# Patient Record
Sex: MALE | Race: BLACK OR AFRICAN AMERICAN
[De-identification: names, ages, dates, MRNs, and addresses within clinical notes are randomized per-mention and may not be internally consistent; named-entity substitution may affect disease eponyms.]

---

## 2017-03-03 NOTE — PDOC
History of Present Illness





- General


History Source: Patient


Exam Limitations: No Limitations





- History of Present Illness


Initial Comments: 


03/03/17 16:15


The patient is a 36 year old male, with significant past medical history of 

multiple episodes of cellulitis (most recently in 2012) and obesity, who 

presents today complaining of 1 day of right groin pain, subjective fever, SOB, 

and 3 days of runny nose and a dry throat. The patient states that the groin 

and leg pain started this morning. The pain radiates from the groin, down the 

RLE, to the right lower ankle. The pain is exacerbated upon movement, touch/

pressure and is alleviated secondary to laying down. He states that this pain 

is similar to the symptoms experienced with cellulitis in the past. The patient 

states that he became short of breath this morning while sitting down. He 

reports a subjective fever and chills also starting this morning. He notes a 

runny nose and dry throat over the past couple of days and has taken nyquil for 

these symptoms.





No cough. 


No abdominal pain, nausea, vomiting.


Denies recent strenuous activity or recent trauma.





Allergies: none reported


Social Hx: No tobacco use. 


PCP- Dr. Galina Morgan ([phone])








03/03/17 16:21








<Viv Sibley - Last Filed: 03/03/17 17:28>





<Thierry Hicks - Last Filed: 03/03/17 17:43>





- General


Chief Complaint: Pain, Acute


Stated Complaint: RIGHT LEG PAIN


Time Seen by Provider: 03/03/17 14:22





Past History





<Viv Sibley - Last Filed: 03/03/17 17:28>





- Past Medical History


Other medical history: DENIES





- Psycho/Social/Smoking Cessation Hx


Anxiety: No


Suicidal Ideation: No


Smoking Status: No


Smoking History: Never smoked


Years of Tobacco Use: 0


Number of Cigarettes Smoked Daily: 0


Hx Alcohol Use: No


Drug/Substance Use Hx: No


Substance Use Type: None


Hx Substance Use Treatment: No





<Thierry Hicks - Last Filed: 03/03/17 17:43>





- Past Medical History


Allergies/Adverse Reactions: 


 Allergies











Allergy/AdvReac Type Severity Reaction Status Date / Time


 


No Known Allergies Allergy   Verified 03/03/17 14:14











Home Medications: 


Ambulatory Orders





NK [No Known Home Medication]  03/03/17 











**Review of Systems





- Review of Systems


Comments:: 





03/03/17 16:15


CONSTITUTIONAL:


Present: subjective fever, chills


Absent: Diaphoresis, Generalized Weakness, Malaise, Loss of Appetite


HEENT:


Present: Rhinorrhea, dry throat.


Absent: Nasal Congestion, Throat Swelling, Difficulty Swallowing, Mouth Swelling

, Ear Pain, Eye Pain, Visual Changes


CARDIOVASCULAR:


Absent: Chest Pain, Syncope, Palpitations, Irregular Heart Rate, Lightheadedness

, Peripheral Edema


RESPIRATORY:


Present: Shortness of breath


Absent: Cough, SOB with Exertion, Orthopnea, Wheezing, Stridor, Hemoptysis


GASTROINTESTINAL:


Absent: Abdominal pain, Abdominal Distension, Nausea, Vomiting, Diarrhea, 

Constipation, Melena, Hematochezia


GENITOURINARY:


Absent: Dysuria, Frequency, Urgency, Hesitancy, Flank Pain, Genital Pain


MUSCULOSKELETAL:


Present: right groin pain radiating down the right leg to the right ankle.


Absent: Myalgia, Arthralgia, Joint Swelling, Back pain, Neck Pain


SKIN:


Absent: Rash, Itching, Pallor








<Viv Sibley - Last Filed: 03/03/17 17:28>





*Physical Exam





- Vital Signs


 Last Vital Signs











Temp Pulse Resp BP Pulse Ox


 


 103 F H  141 H  24   131/76   98 


 


 03/03/17 14:13  03/03/17 14:13  03/03/17 14:13  03/03/17 14:13  03/03/17 14:13














- Physical Exam


Comments: 


03/03/17 16:17


GENERAL: The patient is awake, alert, and fully oriented, in no acute distress. 

+He appears tachypneic. +Patient is morbidly obese and states he weighs 425lbs. 


HEAD: Normal with no signs of trauma.


EYES: Pupils equal, round and reactive to light, extraocular movements intact, 

sclera anicteric, conjunctiva clear.


ENT: Ears normal, nares patent, oropharynx clear without exudates. Moist mucous 

membranes.


NECK: Normal range of motion, supple without lymphadenopathy, JVD, or masses.


LUNGS: Breath sounds equal, clear to auscultation bilaterally. No wheezes, and 

no crackles.


HEART: +tachycardic. Regular rhythm, normal S1 and S2 without murmur, rub or 

gallop.


ABDOMEN: +Obese. Soft, nontender, normoactive bowel sounds. No guarding, no 

rebound. No masses.


EXTREMITIES: +Right hip and knee with normal ROM with no pain. Right medial 

lower leg with increased warmth, increased redness, and tenderness to 

palpation.Pulses normal. Remainder of the extremities normal.


NEUROLOGICAL: Cranial nerves II through XII grossly intact. Normal speech, 

normal gait.


PSYCH: Normal mood, normal affect.


SKIN: +erythematous right medial ankle and right leg with increased warmth to 

that area. Warm, Dry, normal turgor, no rashes or lesions noted.








<Viv Sibley - Last Filed: 03/03/17 17:28>





- Vital Signs


 Last Vital Signs











Temp Pulse Resp BP Pulse Ox


 


 103 F H  141 H  24   131/76   98 


 


 03/03/17 14:13  03/03/17 14:13  03/03/17 14:13  03/03/17 14:13  03/03/17 14:13














<Thierry Hicks - Last Filed: 03/03/17 17:43>





**Heart Score/ECG Review





- ECG Intrepretation


Comment:: 





03/03/17 16:14


12 Lead EKG shows sinus tachycardia at a rate of 114 bpm.  The axis is normal.  

The intervals are normal.  There are no acute ST elevations or depressions.





Impression: Sinus tachycardia at a rate of 114 bpm.  Otherwise normal EKG.





<Thierry Hicks - Last Filed: 03/03/17 17:43>





ED Treatment Course





- LABORATORY


CBC & Chemistry Diagram: 


 03/03/17 15:16





 03/03/17 15:16





- ADDITIONAL ORDERS


Additional order review: 


 Laboratory  Results











  03/03/17





  15:16


 


Sodium  130 L


 


Potassium  3.8


 


Chloride  98  D


 


Carbon Dioxide  23


 


Anion Gap  9


 


BUN  12  D


 


Creatinine  1.1  D


 


Creat Clearance w eGFR  > 60


 


Random Glucose  91


 


Calcium  9.0


 


Total Bilirubin  0.7  D


 


AST  20  D


 


ALT  25


 


Alkaline Phosphatase  76  D


 


Total Protein  7.6  D


 


Albumin  3.9  D














- RADIOLOGY


Radiograph Interpretation: 


03/03/17 16:58


Chest X-ray 


As reported by Dr. Nico Swan.


Impression: Since 6/5/2013 there are prominent soft tissues with slight 

increase in central markings, but no sign of infiltrate or failure. The 

mediastinum is not widened. Correlation recommended. If symptoms persist, 

futher imaging may be of help.





03/03/17 17:30


EXAM#: TYPE/EXAM: RESULT: [id number] US/DUPLEX VASCUL US-1 LEG Right lower 

extremity venous ultrasound. Clinical information given: right leg pain, 

evaluate for DVT The exam was performed utilizing compression, grayscale, color 

flow and doppler sonography. 





There is no sonographic evidence of deep vein thrombosis. If there is clinical 

concern for possible isolated deep calf vein thrombosis or if there is a 

clinical diagnosis of uncomplicated superficial thrombophlebitis, then 

correlation with follow up sonography is suggested in approximately 3-7 days.. 

There is no obvious popliteal cyst. Impression: No DVT is identified involving 

the right leg. Please see above. Reported By: Jose Rafael Orta MD 03/03/17 [id number] 





- Medications


Given in the ED: 


ED Medications














Discontinued Medications














Generic Name Dose Route Start Last Admin





  Trade Name Freq  PRN Reason Stop Dose Admin


 


Acetaminophen  975 mg 03/03/17 15:04 03/03/17 15:36





  Tylenol -  PO 03/03/17 15:05  975 mg





  ONCE ONE   Administration


 


Sodium Chloride  1,000 mls @ 1,000 mls/hr 03/03/17 15:01 03/03/17 15:36





  Normal Saline -  IV 03/03/17 16:00  1,000 mls/hr





  ASDIR STA   Administration


 


Vancomycin HCl 1,500 mg/  250 mls @ 250 mls/hr 03/03/17 15:07 03/03/17 16:06





  Dextrose  IVPB 03/03/17 16:06  250 mls/hr





  ONCE ONE   Administration


 


Piperacillin Sod/Tazobactam Sod  4.5 gm 03/03/17 15:07 03/03/17 15:25





  Zosyn -  IVPB 03/03/17 15:08  4.5 gm





  ONCE ONE   Administration














<Viv Sibley - Last Filed: 03/03/17 17:28>





- LABORATORY


CBC & Chemistry Diagram: 


 03/03/17 15:16





 03/03/17 15:16





- ADDITIONAL ORDERS


Additional order review: 


 Laboratory  Results











  03/03/17





  15:16


 


Sodium  130 L


 


Potassium  3.8


 


Chloride  98  D


 


Carbon Dioxide  23


 


Anion Gap  9


 


BUN  12  D


 


Creatinine  1.1  D


 


Creat Clearance w eGFR  > 60


 


Random Glucose  91


 


Calcium  9.0


 


Total Bilirubin  0.7  D


 


AST  20  D


 


ALT  25


 


Alkaline Phosphatase  76  D


 


Total Protein  7.6  D


 


Albumin  3.9  D














- RADIOLOGY


Radiology Studies Ordered: 














 Category Date Time Status


 


 CHEST X-RAY PORTABLE* [RAD] Stat Radiology  03/03/17 15:02 Taken


 


 DUPLEX VASCUL US-1 LEG [US] Stat Ultrasound  03/03/17 16:10 Ordered














- Medications


Given in the ED: 


ED Medications














Discontinued Medications














Generic Name Dose Route Start Last Admin





  Trade Name Sukh  PRN Reason Stop Dose Admin


 


Acetaminophen  975 mg 03/03/17 15:04 03/03/17 15:36





  Tylenol -  PO 03/03/17 15:05  975 mg





  ONCE ONE   Administration


 


Sodium Chloride  1,000 mls @ 1,000 mls/hr 03/03/17 15:01 03/03/17 15:36





  Normal Saline -  IV 03/03/17 16:00  1,000 mls/hr





  ASDIR STA   Administration


 


Vancomycin HCl 1,500 mg/  250 mls @ 250 mls/hr 03/03/17 15:07 03/03/17 16:06





  Dextrose  IVPB 03/03/17 16:06  250 mls/hr





  ONCE ONE   Administration


 


Piperacillin Sod/Tazobactam Sod  4.5 gm 03/03/17 15:07 03/03/17 15:25





  Zosyn -  IVPB 03/03/17 15:08  4.5 gm





  ONCE ONE   Administration














<Thierry Hicks - Last Filed: 03/03/17 17:43>





Medical Decision Making





- Critical Care Time


Total Critical Care Time (minutes): 55 (r/o severe sepsis)


Critical Care Statement: The care of this patient involved high complexity 

decision making to prevent further life threatening deterioration of the patient

's condition and/or to evalute & treat vital organ system(s) failure or risk of 

failure.





- Medical Decision Making


03/03/17 17:30


36-year-old man with a history of multiple episodes of recurrent cellulitis 

requiring hospitalization on multiple prior episodes.  Patient presents 

complaining of fever, chills, and right leg pain consistent with prior episodes 

of cellulitis.  He states he has tenderness and redness to the right medial 

lower leg.  He denies cough or dysuria.





On examination, the patient has a fever to 103 and tachycardia to 140.  His 

head and neck exam is without signs of upper respiratory infection.  Lungs are 

clear.  Abdomen is benign.  Extremities are notable for the right lower leg 

being warmer than the left with redness and tenderness to the right medial 

lower leg.  Pulses are normal.  There is no muscle tenderness or induration.





Laboratory studies reviewed.  White blood cell count is markedly elevated with 

increased neutrophils.  Chest x-ray is clear.  Lactate is normal.  Renal 

function is normal.  There was an initial concern for severe sepsis, however 

there are no signs of severe sepsis by laboratory criteria.  VBG shows 

respiratory alkalosis.  D-dimer screening test is negative for DVT.  Ultrasound 

of the right lower extremity is negative for DVT.





Patient complained of some dyspnea, but with that was in the setting of fever 

that resolved with Tylenol.  Pulse oximetry on room air despite severe obesity 

has been %.  





Impression: Right lower extremity cellulitis, recurrent, with high fever and 

markedly elevated white blood cell count.  Patient will be treated with IV 

antibiotics pending clinical improvement.





The scribe's documentation has been prepared under my direction and personally 

reviewed by me in its entirety.  I have confirmed that the note above 

accurately reflects all work, treatment, procedures, and medical decision-

making performed by me.





03/03/17 17:42


Discussed with hospitalist and they will admit.





<Thierry Hicks - Last Filed: 03/03/17 17:43>





*DC/Admit/Observation/Transfer





- Attestations


Scribe Attestion: 





03/03/17 16:18





Documentation prepared by OLAMIDE Silva, acting as medical scribe 

for Thierry Hicks MD.





<Viv Sibley - Last Filed: 03/03/17 17:28>





- Discharge Dispostion


Admit: Yes


Decision to Admit order Date/Time: 





03/03/17 17:35


Right lower extremity cellulitis, hospitalist texted.





<Thierry Hicks - Last Filed: 03/03/17 17:43>


Diagnosis at time of Disposition: 


 Cellulitis of right lower extremity





- Discharge Dispostion


Condition at time of disposition: Stable

## 2017-03-03 NOTE — HP
CHIEF COMPLAINT: Right Groin, R Lower Extremity Pain





PCP: Not on Staff





HISTORY OF PRESENT ILLNESS:


This is a 37 y/o male with a past medical history of recurrent lower extremity 

cellulitis ( last event 2012), Morbid Obesity. Who presents to the emergency 

department with pain to right groin radiating to RLE. Patient also reports 

increased swelling and redness to right medial ankle x this am. Patient reports 

increase pain with movement. Patient reports having subjective fever at home 

with a runny nose and throat irritation. Patient denies SOB, CP, AP, N/V/D, 

constipation, diarrhea, dysuria. 





ER course was notable for:


(1) Sepsis: T 103, P 141, WBC 18.5 with L shift


(2) Duplex Right lower extremity- No DVT


(3) Vancomycin, Zosyn given in ED





Recent Travel: None





PAST MEDICAL HISTORY:


See HPI





PAST SURGICAL HISTORY:


Denies





Social History:


Smoking: Denies


Alcohol:  Denies


Drugs:    Denies


Lives with spouse





Family History:


Diabetes: Mother, Father


HTN: Mother, Father





Allergies





No Known Allergies Allergy (Verified 03/03/17 14:14)


 








HOME MEDICATIONS:


 Home Medications











 Medication  Instructions  Recorded


 


NK [No Known Home Medication]  03/03/17








 REVIEW OF SYSTEMS


CONSTITUTIONAL:  fever


Absent: chills, diaphoresis, generalized weakness, malaise, loss of appetite, 

weight change


HEENT: rhinorrhea, dry throat


Absent:  nasal congestion, throat pain, throat swelling, difficulty swallowing, 

mouth swelling, ear pain, eye pain, visual changes


CARDIOVASCULAR: 


Absent: chest pain, syncope, palpitations, irregular heart rate, lightheadedness

, peripheral edema


RESPIRATORY: 


Absent: cough, shortness of breath, dyspnea with exertion, orthopnea, wheezing, 

stridor, hemoptysis


GASTROINTESTINAL:


Absent: abdominal pain, abdominal distension, nausea, vomiting, diarrhea, 

constipation, melena, hematochezia


GENITOURINARY: right groin pain


Absent: dysuria, frequency, urgency, hesitancy, hematuria, flank pain, genital 

pain


MUSCULOSKELETAL: right leg pain


Absent: myalgia, arthralgia, joint swelling, back pain, neck pain


SKIN: 


Absent: rash, itching, pallor


HEMATOLOGIC/IMMUNOLOGIC: 


Absent: easy bleeding, easy bruising, lymphadenopathy, frequent infections


ENDOCRINE:


Absent: unexplained weight gain, unexplained weight loss, heat intolerance, 

cold intolerance


NEUROLOGIC: 


Absent: headache, focal weakness or paresthesias, dizziness, unsteady gait, 

seizure, mental status changes, bladder or bowel incontinence


PSYCHIATRIC: 


Absent: anxiety, depression, suicidal or homicidal ideation, hallucinations.








PHYSICAL EXAMINATION


 Vital Signs - 24 hr











  03/03/17 03/03/17 03/03/17





  14:13 16:38 17:28


 


Temperature 103 F H  102.9 F H


 


Pulse Rate 141 H  


 


Pulse Rate [  117 H 117 H





Apical]   


 


Respiratory 24 22 24





Rate   


 


Blood Pressure 131/76  


 


Blood Pressure  102/58 101/71





[Right]   


 


O2 Sat by Pulse 98 97 98





Oximetry (%)   














  03/03/17





  18:27


 


Temperature 100.9 F H


 


Pulse Rate 


 


Pulse Rate [ 114 H





Apical] 


 


Respiratory 24





Rate 


 


Blood Pressure 


 


Blood Pressure 100/56





[Right] 


 


O2 Sat by Pulse 98





Oximetry (%) 











GENERAL: Morbidly Obese, Awake, alert, and fully oriented, in no acute distress.


HEAD: Normal with no signs of trauma.


EYES: Pupils equal, round and reactive to light, extraocular movements intact, 

sclera anicteric, conjunctiva clear. No lid lag.


EARS, NOSE, THROAT: Ears normal, nares patent, oropharynx clear without 

exudates. Moist mucous membranes.


NECK: Normal range of motion, supple without lymphadenopathy, JVD, or masses.


LUNGS: Breath sounds equal, clear to auscultation bilaterally. No wheezes, and 

no crackles. No accessory muscle use.


HEART: Regular rate and rhythm, normal S1 and S2 without murmur, rub or gallop.


ABDOMEN: Soft, Obese, nontender, not distended, normoactive bowel sounds, no 

guarding, no rebound, no masses.  No hepatomegaly or  splenomegaly. 


MUSCULOSKELETAL: Normal range of motion at all joints. No bony deformities or 

tenderness. No CVA tenderness.


UPPER EXTREMITIES: 2+ pulses, warm, well-perfused. No cyanosis. No clubbing. 

Cap refill <2 seconds. No peripheral edema.


LOWER EXTREMITIES: 2+ pulses, warm, well-perfused. No calf tenderness. +1 RLE 

peripheral edema. 


NEUROLOGICAL:  Cranial nerves II-XII intact. Normal speech. Normal gait.


PSYCHIATRIC: Cooperative. Good eye contact. Appropriate mood and affect.


SKIN: +Erythema to medial ankle, Warm, dry, normal turgor, no rashes or lesions 

noted. 





 Laboratory Results - last 24 hr











  03/03/17 03/03/17 03/03/17





  15:07 15:07 15:07


 


WBC   


 


RBC   


 


Hgb   


 


Hct   


 


MCV   


 


MCHC   


 


RDW   


 


Plt Count   


 


MPV   


 


Neutrophils %   


 


Lymphocytes %   


 


Monocytes %   


 


Eosinophils %   


 


Basophils %   


 


ESR   


 


INR   


 


PTT (Actin FS)   


 


D-Dimer   


 


VBG pH   


 


POC VBG pCO2   


 


POC VBG pO2   


 


Mixed VBG HCO3   


 


Sodium   


 


Potassium   


 


Chloride   


 


Carbon Dioxide   


 


Anion Gap   


 


BUN   


 


Creatinine   


 


Creat Clearance w eGFR   


 


Random Glucose   


 


Lactic Acid  1.830  


 


Calcium   


 


Total Bilirubin   


 


AST   


 


ALT   


 


Alkaline Phosphatase   


 


Creatine Kinase    195 H


 


CK-MB (CK-2)    0.7


 


CK-MB (CK-2) Rel Index    0.4


 


Troponin I    < 0.03 L


 


C-Reactive Protein   


 


Total Protein   


 


Albumin   


 


Urine Color   


 


Urine Appearance   


 


Urine pH   


 


Ur Specific Gravity   


 


Urine Protein   


 


Urine Glucose (UA)   


 


Urine Ketones   


 


Urine Blood   


 


Urine Nitrite   


 


Urine Bilirubin   


 


Urine Urobilinogen   


 


Ur Leukocyte Esterase   


 


Blood Type   O POSITIVE 


 


Antibody Screen   Negative 














  03/03/17 03/03/17 03/03/17





  15:07 15:07 15:07


 


WBC   


 


RBC   


 


Hgb   


 


Hct   


 


MCV   


 


MCHC   


 


RDW   


 


Plt Count   


 


MPV   


 


Neutrophils %   


 


Lymphocytes %   


 


Monocytes %   


 


Eosinophils %   


 


Basophils %   


 


ESR  35 H  


 


INR   


 


PTT (Actin FS)   


 


D-Dimer    < 200


 


VBG pH   


 


POC VBG pCO2   


 


POC VBG pO2   


 


Mixed VBG HCO3   


 


Sodium   


 


Potassium   


 


Chloride   


 


Carbon Dioxide   


 


Anion Gap   


 


BUN   


 


Creatinine   


 


Creat Clearance w eGFR   


 


Random Glucose   


 


Lactic Acid   


 


Calcium   


 


Total Bilirubin   


 


AST   


 


ALT   


 


Alkaline Phosphatase   


 


Creatine Kinase   


 


CK-MB (CK-2)   


 


CK-MB (CK-2) Rel Index   


 


Troponin I   


 


C-Reactive Protein   5.3 H 


 


Total Protein   


 


Albumin   


 


Urine Color   


 


Urine Appearance   


 


Urine pH   


 


Ur Specific Gravity   


 


Urine Protein   


 


Urine Glucose (UA)   


 


Urine Ketones   


 


Urine Blood   


 


Urine Nitrite   


 


Urine Bilirubin   


 


Urine Urobilinogen   


 


Ur Leukocyte Esterase   


 


Blood Type   


 


Antibody Screen   














  03/03/17 03/03/17 03/03/17





  15:07 15:07 15:16


 


WBC    18.5 H D


 


RBC    5.14


 


Hgb    14.3


 


Hct    42.9


 


MCV    83.6


 


MCHC    33.3


 


RDW    14.1


 


Plt Count    395


 


MPV    8.1


 


Neutrophils %    89.0 H


 


Lymphocytes %    6.0 L D


 


Monocytes %    3.0 L


 


Eosinophils %    0.2


 


Basophils %    1.8  D


 


ESR   


 


INR   


 


PTT (Actin FS)   


 


D-Dimer   


 


VBG pH   


 


POC VBG pCO2   


 


POC VBG pO2   


 


Mixed VBG HCO3   


 


Sodium   


 


Potassium   


 


Chloride   


 


Carbon Dioxide   


 


Anion Gap   


 


BUN   


 


Creatinine   


 


Creat Clearance w eGFR   


 


Random Glucose   


 


Lactic Acid   


 


Calcium   


 


Total Bilirubin   


 


AST   


 


ALT   


 


Alkaline Phosphatase   


 


Creatine Kinase   


 


CK-MB (CK-2)   Cancelled 


 


CK-MB (CK-2) Rel Index   


 


Troponin I   


 


C-Reactive Protein   


 


Total Protein   


 


Albumin   


 


Urine Color   


 


Urine Appearance   


 


Urine pH   


 


Ur Specific Gravity   


 


Urine Protein   


 


Urine Glucose (UA)   


 


Urine Ketones   


 


Urine Blood   


 


Urine Nitrite   


 


Urine Bilirubin   


 


Urine Urobilinogen   


 


Ur Leukocyte Esterase   


 


Blood Type  O POSITIVE  


 


Antibody Screen   














  03/03/17 03/03/17 03/03/17





  15:16 15:16 16:10


 


WBC   


 


RBC   


 


Hgb   


 


Hct   


 


MCV   


 


MCHC   


 


RDW   


 


Plt Count   


 


MPV   


 


Neutrophils %   


 


Lymphocytes %   


 


Monocytes %   


 


Eosinophils %   


 


Basophils %   


 


ESR   


 


INR  1.30 H  


 


PTT (Actin FS)  35.3  


 


D-Dimer   


 


VBG pH    7.50 H


 


POC VBG pCO2    31.0 L


 


POC VBG pO2    23.5 L*


 


Mixed VBG HCO3    24.0


 


Sodium   130 L 


 


Potassium   3.8 


 


Chloride   98  D 


 


Carbon Dioxide   23 


 


Anion Gap   9 


 


BUN   12  D 


 


Creatinine   1.1  D 


 


Creat Clearance w eGFR   > 60 


 


Random Glucose   91 


 


Lactic Acid   


 


Calcium   9.0 


 


Total Bilirubin   0.7  D 


 


AST   20  D 


 


ALT   25 


 


Alkaline Phosphatase   76  D 


 


Creatine Kinase   


 


CK-MB (CK-2)   


 


CK-MB (CK-2) Rel Index   


 


Troponin I   


 


C-Reactive Protein   


 


Total Protein   7.6  D 


 


Albumin   3.9  D 


 


Urine Color   


 


Urine Appearance   


 


Urine pH   


 


Ur Specific Gravity   


 


Urine Protein   


 


Urine Glucose (UA)   


 


Urine Ketones   


 


Urine Blood   


 


Urine Nitrite   


 


Urine Bilirubin   


 


Urine Urobilinogen   


 


Ur Leukocyte Esterase   


 


Blood Type   


 


Antibody Screen   














  03/03/17





  16:24


 


WBC 


 


RBC 


 


Hgb 


 


Hct 


 


MCV 


 


MCHC 


 


RDW 


 


Plt Count 


 


MPV 


 


Neutrophils % 


 


Lymphocytes % 


 


Monocytes % 


 


Eosinophils % 


 


Basophils % 


 


ESR 


 


INR 


 


PTT (Actin FS) 


 


D-Dimer 


 


VBG pH 


 


POC VBG pCO2 


 


POC VBG pO2 


 


Mixed VBG HCO3 


 


Sodium 


 


Potassium 


 


Chloride 


 


Carbon Dioxide 


 


Anion Gap 


 


BUN 


 


Creatinine 


 


Creat Clearance w eGFR 


 


Random Glucose 


 


Lactic Acid 


 


Calcium 


 


Total Bilirubin 


 


AST 


 


ALT 


 


Alkaline Phosphatase 


 


Creatine Kinase 


 


CK-MB (CK-2) 


 


CK-MB (CK-2) Rel Index 


 


Troponin I 


 


C-Reactive Protein 


 


Total Protein 


 


Albumin 


 


Urine Color  Yellow


 


Urine Appearance  Clear


 


Urine pH  7.5


 


Ur Specific Gravity  1.020


 


Urine Protein  Negative


 


Urine Glucose (UA)  Negative


 


Urine Ketones  Negative


 


Urine Blood  Trace-lysed


 


Urine Nitrite  Negative


 


Urine Bilirubin  Negative


 


Urine Urobilinogen  0.2 e.u/dl


 


Ur Leukocyte Esterase  Negative


 


Blood Type 


 


Antibody Screen 











ASSESSMENT/PLAN:


This is a 37 y/o male with a PMHx of recurrent RLE cellulitis, Morbid Obesity. 

Presents to the ED with R-Groin Pain, RLE pain and swelling. Admitted for 

Sepsis secondary to Cellulitis of Right Lower Extremity for further evaluation 

of their emergent condition.





Plan:


1. Sepsis


- Likely secondary to RLE Cellulitis


- Blood Cultures-pending


- Given Vancomcyin/Zosyn


- Will continue Vancomycin for MRSA coverage


- Appreciate ID Consult


- Monitor temp


- Monitor CBCD


- Continue IVF





2. Cellulitis of Right Lower Extremity


- Continue Vancomycin


- Appreciate ID Consult


- Doppler of RLE- Neg DVT





3. Morbid Obesity


- Carb Control Diet


- f/u with Bariatrics in outpatient setting





4. FEN


- NS@100cc/hr


- Replete lytes


- Carb Control Diet





5. DVT Prophylaxis


- OOB


- Heparin SQ





Code Status: Full Code

















Problem List





- Problem


(1) Sepsis


Code(s): A41.9 - SEPSIS, UNSPECIFIED ORGANISM   





(2) Cellulitis of right lower extremity


Code(s): L03.115 - CELLULITIS OF RIGHT LOWER LIMB





(3) Morbid (severe) obesity due to excess calories


Code(s): E66.01 - MORBID (SEVERE) OBESITY DUE TO EXCESS CALORIES





(4) DVT prophylaxis


Code(s): TLF1444 - 








Visit type





- Emergency Visit


Emergency Visit: Yes


ED Registration Date: 03/03/17


Care time: The patient presented to the Emergency Department on the above date 

and was hospitalized for further evaluation of their emergent condition.





- New Patient


This patient is new to me today: Yes


Date on this admission: 03/03/17





- Critical Care


Critical Care patient: No

## 2017-03-04 NOTE — CONS
DATE OF CONSULTATION:

 

HISTORY:  The patient is a 36-year-old morbidly obese male who was evaluated for

recurrent cellulitis of the right lower extremity.  The patient was admitted with a

1-day history of right groin pain and right lower extremity erythema and warmth.  The

patient developed abrupt onset of right groin pain followed by increasing tenderness,

erythema, and warmth of the right lower extremity.  He also had subjective fever and

shortness of breath.  He presented to the emergency room at [facility]. 

His temperature was noted to be 103 and white blood cell count 18,000.  A Doppler

exam was performed of the leg and was negative for DVT.  He denies any traumatic

injury.  No insect or animal bites or scratches.  He has had a history of recurrent

right lower extremity cellulitis dating back to 1999.  I have seen him on at least 2

occasions.  He was last here in June 2013 for recurrent right lower extremity

cellulitis.  In 2010 his recurrent right lower extremity cellulitis was complicated

by group B streptococcus bacteremia.  He had previous episodes in 2002 and in 1999.

 

PAST MEDICAL HISTORY:  Also positive for morbid obesity, bronchial asthma, eczema.

 

ALLERGIES:  No known allergies.

 

MEDICATIONS:  Tylenol, vancomycin, Unasyn, heparin, oxycodone.

 

SOCIAL HISTORY:  He lives at home with his significant other.  Nonsmoker. 

Nondrinker.

 

SYSTEMS REVIEW:

Neurologic:  No loss of consciousness, seizure activity, focal weakness.

Cardiac:  Negative for chest pain or palpitations.

Respiratory:  Negative for cough or sputum production.

Gastrointestinal:  Negative vomiting or diarrhea.

Genitourinary:  Negative for urinary tract infection.

 

LABORATORY DATA:  White count on admission 18.5, presently 13.9, hematocrit 40.9,

platelet count 351.  BUN 9, creatinine 1.1.  Urine:  Leukocyte esterase negative. 

Cultures pending.

 

PHYSICAL EXAMINATION: 

General:  He is morbidly obese.  He is not acutely toxic appearing.

Vital Signs:  Temperature 102.5, blood pressure 114/67, pulse 123, regular,

respirations 20 per minute.

HEENT:  Sclerae anicteric.

Heart:  Sounds S1, S2.

Lungs:  Clear.

Abdomen:  Obese, soft, nontender.  There is tenderness present in the right inguinal

ligament area with a palpable right inguinal adenopathy.

Extremities:  Right lower extremity, there is erythema and warmth present from the

distal right lower extremity from below the knee to the foot.  It is tender to touch.

 There is no crepitus or fluctuance.  No lymphangitic streaking.

 

IMPRESSION: 

1.  Recurrent right lower extremity cellulitis.

2.  Fever, leukocytosis, possible sepsis secondary to cellulitis.

3.  Morbid obesity.

 

PLAN:  Await culture results.  Empiric antibiotic coverage with vancomycin 1500 mg IV

piggyback every 12 hours, Unasyn 3 g IV piggyback every 6 hours.  Elevation. 

Analgesics.  Further recommendations pending cultures.  We will follow.

 

Thank you for the kind referral.

 

 

 

ARLIN KEARNS M.D.

 

[id number]

DD: 03/04/2017 10:11

DT: 03/04/2017 10:25

Job #:  [id number]

## 2017-03-04 NOTE — PN
Progress Note (short form)





- Note


Progress Note: 


ID Consult dictated


Recurrent cellulitis R LE


Possible sepsis secondary to cellulitis


Morbid obesity


Pending c/s empiric vanco/ unasyn

## 2017-03-04 NOTE — PN
Physical Exam: 


SUBJECTIVE: Patient seen and examined. Appears tachypneic but states that he is 

not short of breath. Complaining of right leg pain, not controlled with Tylenol.








OBJECTIVE: Hospital day #1 for this 36 year old male with morbid obesity 

admitted with sepsis likely secondary to recurrent cellulitis.





 Vital Signs











 Period  Temp  Pulse  Resp  BP Sys/Shahid  Pulse Ox


 


 Last 24 Hr  99.7 F-102.5 F  107-123  18-20  111-114/56-67  98-99











GENERAL: The patient is awake, alert, and fully oriented, in no acute distress.


HEAD: Normal with no signs of trauma.


EYES: PERRL, extraocular movements intact, sclera anicteric, conjunctiva clear. 

No ptosis. 


ENT: Ears normal, nares patent, oropharynx clear without exudates, moist mucous 


membranes.


NECK: Trachea midline, full range of motion, supple. 


LUNGS: Breath sounds equal, clear to auscultation bilaterally, no wheezes, no 

crackles, no 


accessory muscle use. 


HEART: Regular rate and rhythm, S1, S2 without murmur, rub or gallop.


ABDOMEN: Soft, nontender, nondistended, normoactive bowel sounds, no guarding, 

no 


rebound, no hepatosplenomegaly, no masses.


EXTREMITIES: 2+ pulses, warm, well-perfused, no edema. 


NEUROLOGICAL: Cranial nerves II through XII grossly intact. Normal speech, gait 

not 


observed.


PSYCH: Normal mood, normal affect.


SKIN: Erythema and warmth right ankle to mid-calf.














 Laboratory Results - last 24 hr











  03/04/17





  07:32


 


WBC  13.9 H


 


RBC  4.91


 


Hgb  13.6


 


Hct  40.9


 


MCV  83.4


 


MCHC  33.4


 


RDW  14.2


 


Plt Count  351


 


MPV  7.7


 


Neutrophils %  86.6 H


 


Lymphocytes %  8.2  D


 


Monocytes %  5.0


 


Eosinophils %  0.0  D


 


Basophils %  0.2








Active Medications











Generic Name Dose Route Start Last Admin





  Trade Name Freq  PRN Reason Stop Dose Admin


 


Acetaminophen  650 mg 03/03/17 19:55 03/04/17 05:26





  Tylenol -  PO   650 mg





  Q6H PRN   Administration





  FEVER OR PAIN   


 


Heparin Sodium (Porcine)  5,000 unit 03/03/17 22:00 03/04/17 05:27





  Heparin -  SQ   5,000 unit





  TID MELA   Administration


 


Sodium Chloride  1,000 mls @ 100 mls/hr 03/03/17 20:15 03/03/17 21:00





  Normal Saline -  IV   100 mls/hr





  ASDIR MELA   Administration


 


Ampicillin Sodium/Sulbactam  100 mls @ 200 mls/hr 03/04/17 10:00  





  Sodium 3 gm/ Sodium Chloride  IVPB   





  Q8H-IV MELA   


 


Vancomycin HCl 1,500 mg/  250 mls @ 166.667 mls/hr 03/04/17 10:00  





  Dextrose  IVPB   





  Q12H MELA   











ASSESSMENT/PLAN: 36 year old male with sepsis secondary to RLE cellulitis.





1. ID: Sepsis likely secondary to RLE cellulitis


-Given Vancomycin 1.5g and Zosyn 4.5g in ED


-Continue Vancomycin 1.5g bid and Unasyn 3g q6h pending ID approval


-Follow up blood cultures


-INR is mildly prolonged at 1.3; repeat tomorrow


-Continue IVF


-RLE Duplex is negative for DVT, d-dimer is within normal limits





2. Morbid Obesity


-Dietary counseling





4. FEN


-NS@100 mLs/hr


-Replete electrolytes as indicated





5. DVT Prophylaxis


- OOB


- Heparin SQ





Code Status: Full Code














Visit type





- Emergency Visit


Emergency Visit: Yes


ED Registration Date: 03/03/17


Care time: The patient presented to the Emergency Department on the above date 

and was hospitalized for further evaluation of their emergent condition.





- New Patient


This patient is new to me today: Yes


Date on this admission: 03/04/17





- Critical Care


Critical Care patient: No

## 2017-03-05 NOTE — PN
[id number] Vital Signs











 Period  Temp  Pulse  Resp  BP Sys/Shahid  Pulse Ox


 


 Last 24 Hr  98.8 F-102.9 F    18-19  116-144/72-83  











GENERAL: The patient is awake, alert, and fully oriented, in no acute distress.


EYES: PERRL, extraocular movements intact, sclera anicteric, conjunctiva clear. 

No ptosis. 


ENT: Ears normal, nares patent, oropharynx clear without exudates, moist mucous 


membranes.


NECK: Trachea midline, full range of motion, supple. 


LUNGS: Breath sounds equal, clear to auscultation bilaterally, no wheezes, no 

crackles, no 


accessory muscle use. 


HEART: Regular rate and rhythm, S1, S2 without murmur, rub or gallop.


ABDOMEN: Soft, nontender, nondistended, normoactive bowel sounds, no guarding, 

no 


rebound, no hepatosplenomegaly, no masses.


EXTREMITIES: 2+ pulses, warm, well-perfused, no edema. 


NEUROLOGICAL: Cranial nerves II through XII grossly intact. Normal speech, gait 

not 


observed.


PSYCH: Normal mood, normal affect.


SKIN: Erythema and warmth right ankle to right mid-calf. No abscess or drainage.














Active Medications











Generic Name Dose Route Start Last Admin





  Trade Name Freq  PRN Reason Stop Dose Admin


 


Acetaminophen  650 mg 03/03/17 19:55 03/04/17 22:30





  Tylenol -  PO   650 mg





  Q6H PRN   Administration





  FEVER OR PAIN   


 


Heparin Sodium (Porcine)  5,000 unit 03/03/17 22:00 03/05/17 06:39





  Heparin -  SQ   5,000 unit





  TID MELA   Administration


 


Sodium Chloride  1,000 mls @ 100 mls/hr 03/03/17 20:15 03/04/17 20:20





  Normal Saline -  IV   100 mls/hr





  ASDIR MELA   Administration


 


Vancomycin HCl 1,500 mg/  250 mls @ 166.667 mls/hr 03/04/17 09:30 03/04/17 22:11





  Dextrose  IVPB   166.667 mls/hr





  Q12H MELA   Administration


 


Ampicillin Sodium/Sulbactam  100 mls @ 200 mls/hr 03/04/17 15:00 03/05/17 02:54





  Sodium 3 gm/ Sodium Chloride  IVPB   200 mls/hr





  Q6H-IV MELA   Administration


 


Oxycodone HCl  5 mg 03/04/17 08:50 03/04/17 22:30





  Roxicodone -  PO   5 mg





  Q6H PRN   Administration





  PAIN   











ASSESSMENT/PLAN: 36 year old male with sepsis secondary to RLE cellulitis.





1. ID: Sepsis likely secondary to RLE cellulitis


-Continue Vancomycin 1.5g bid and Unasyn 3g q6h 


-Check Vanco trough


-Follow up blood cultures


-INR is mildly prolonged, follow


-Continue IVF


-RLE Duplex is negative for DVT, d-dimer is within normal limits


-Blood cultures ngtd





2. Morbid Obesity


-Dietary counseling





4. FEN


-NS@100 mLs/hr


-Replete electrolytes as indicated





5. DVT Prophylaxis


- OOB


- Heparin SQ





Code Status: Full Code








Visit type





- Emergency Visit


Emergency Visit: Yes


ED Registration Date: 03/03/17


Care time: The patient presented to the Emergency Department on the above date 

and was hospitalized for further evaluation of their emergent condition.





- New Patient


This patient is new to me today: No





- Critical Care


Critical Care patient: No

## 2017-03-05 NOTE — EKG
Test Reason : 

Blood Pressure : ***/*** mmHG

Vent. Rate : 114 BPM     Atrial Rate : 114 BPM

   P-R Int : 152 ms          QRS Dur : 078 ms

    QT Int : 330 ms       P-R-T Axes : 038 011 017 degrees

   QTc Int : 454 ms

 

SINUS TACHYCARDIA

OTHERWISE NORMAL ECG

NO PREVIOUS ECGS AVAILABLE

Confirmed by LUIS FELIPE KHAN MD ([id number]) on 3/5/2017 7:31:30 AM

 

Referred By: Moe Collado           Confirmed By:LUIS FELIPE KHAN MD

## 2017-03-06 NOTE — PN
Physical Exam: 


SUBJECTIVE: Patient seen and examined, reports feeling slightly better, pain 

upon ambulating onto right foot. 





OBJECTIVE: 36 year old male with sepsis secondary to RLE cellulitis.





 Vital Signs











 Period  Temp  Pulse  Resp  BP Sys/Shahid  Pulse Ox


 


 Last 24 Hr  98.2 F-99.6 F    17-19  125-145/70-89  97-98











GENERAL: The patient is awake, alert, and fully oriented, in no acute distress.


HEAD: Normal with no signs of trauma.


EYES: PERRL, extraocular movements intact, sclera anicteric, conjunctiva clear. 

No ptosis. 


ENT: Ears normal, nares patent, oropharynx clear without exudates, moist mucous 


membranes.


NECK: Trachea midline, full range of motion, supple. 


LUNGS: Breath sounds equal, clear to auscultation bilaterally, no wheezes, no 

crackles, no 


accessory muscle use. 


HEART: Regular rate and rhythm, S1, S2 without murmur, rub or gallop.


ABDOMEN: Soft, nontender, nondistended, normoactive bowel sounds, no guarding, 

no 


rebound, no hepatosplenomegaly, no masses.


EXTREMITIES: 2+ pulses, warm, well-perfused


RIGHT LOWER EXTREMTIY: distal circumferential erythema 


NEUROLOGICAL: Cranial nerves II through XII grossly intact. Normal speech, gait 

not 


observed.


PSYCH: Normal mood, normal affect.


SKIN: Warm, dry, normal turgor, no rashes or lesions noted














 Laboratory Results - last 24 hr











  03/06/17 03/06/17 03/06/17





  07:38 07:38 07:38


 


WBC  9.5  


 


RBC  4.70  


 


Hgb  12.7  


 


Hct  39.6  


 


MCV  84.2  


 


MCHC  32.2  


 


RDW  14.2  


 


Plt Count  379  D  


 


MPV  7.9  


 


Neutrophils %  63.7  


 


Lymphocytes %  24.6  D  


 


Monocytes %  8.7  


 


Eosinophils %  2.5  D  


 


Basophils %  0.5  


 


INR   


 


Sodium   135 L 


 


Potassium   3.9 


 


Chloride   105 


 


Carbon Dioxide   23 


 


Anion Gap   7 L 


 


BUN   6 L 


 


Creatinine   0.8 


 


Creat Clearance w eGFR   > 60 


 


Random Glucose   90 


 


Calcium   8.3 L 


 


Total Bilirubin   0.5  D 


 


AST   18 


 


ALT   33  D 


 


Alkaline Phosphatase   69 


 


Total Protein   6.7 


 


Albumin   3.0 L D 


 


Vancomycin Trough    8.019














  03/06/17





  07:38


 


WBC 


 


RBC 


 


Hgb 


 


Hct 


 


MCV 


 


MCHC 


 


RDW 


 


Plt Count 


 


MPV 


 


Neutrophils % 


 


Lymphocytes % 


 


Monocytes % 


 


Eosinophils % 


 


Basophils % 


 


INR  1.12


 


Sodium 


 


Potassium 


 


Chloride 


 


Carbon Dioxide 


 


Anion Gap 


 


BUN 


 


Creatinine 


 


Creat Clearance w eGFR 


 


Random Glucose 


 


Calcium 


 


Total Bilirubin 


 


AST 


 


ALT 


 


Alkaline Phosphatase 


 


Total Protein 


 


Albumin 


 


Vancomycin Trough 








Active Medications











Generic Name Dose Route Start Last Admin





  Trade Name Freq  PRN Reason Stop Dose Admin


 


Acetaminophen  650 mg 03/03/17 19:55 03/05/17 16:01





  Tylenol -  PO   650 mg





  Q6H PRN   Administration





  FEVER OR PAIN   


 


Heparin Sodium (Porcine)  5,000 unit 03/03/17 22:00 03/06/17 06:16





  Heparin -  SQ   5,000 unit





  TID MELA   Administration


 


Sodium Chloride  1,000 mls @ 100 mls/hr 03/03/17 20:15 03/05/17 21:17





  Normal Saline -  IV   100 mls/hr





  ASDIR MELA   Administration


 


Cefazolin Sodium/Dextrose  50 mls @ 100 mls/hr 03/06/17 15:00  





  Ancef 2 Gm Premixed Ivpb -  IVPB   





  Q6H-IV MELA   


 


Oxycodone HCl  5 mg 03/04/17 08:50 03/05/17 11:39





  Roxicodone -  PO   5 mg





  Q6H PRN   Administration





  PAIN   








 Microbiology





03/03/17 15:00   Blood - Peripheral Venous   Blood Culture - Preliminary


                            NO GROWTH OBTAINED AFTER 48 HOURS, INCUBATION TO 

CONTINUE


                            FOR 3 DAYS.


03/03/17 15:07   Blood - Peripheral Venous   Blood Culture - Preliminary


                            NO GROWTH OBTAINED AFTER 48 HOURS, INCUBATION TO 

CONTINUE


                            FOR 3 DAYS.


03/04/17 14:22   Blood - Peripheral Venous   Blood Culture - Preliminary


                            NO GROWTH OBTAINED AFTER 24 HOURS, INCUBATION TO 

CONTINUE


                            FOR 4 DAYS.


03/04/17 14:25   Blood - Peripheral Venous   Blood Culture - Preliminary


                            NO GROWTH OBTAINED AFTER 24 HOURS, INCUBATION TO 

CONTINUE


                            FOR 4 DAYS.


03/03/17 16:24   Urine - Urine Clean Catch   Urine Culture - Final


                            NO GROWTH OBTAINED


03/03/17 16:24   Nasopharyngeal Swab   Influenza Types A,B Antigen (MARS) - Final

, negative 


03/03/17 16:24   Nasopharyngeal Swab    - Final








ASSESSMENT/PLAN:





1. ID: Sepsis likely secondary to RLE cellulitis


-Continue Vancomycin 1.5g bid and Unasyn 3g q6h 


-  Vanco trough wnl 


- blood cultures ntd 


-INR is mildly prolonged, follow 


-RLE Duplex is negative for DVT, d-dimer is within normal limits





2. Morbid Obesity


-Dietary counseling





4. FEN


- low fat diet 


-Replete electrolytes as indicated





5. DVT Prophylaxis


- OOB


- Heparin SQ





Code Status: Full Code











Visit type





- Emergency Visit


Emergency Visit: Yes


ED Registration Date: 03/03/17


Care time: The patient presented to the Emergency Department on the above date 

and was hospitalized for further evaluation of their emergent condition.





- New Patient


This patient is new to me today: Yes


Date on this admission: 03/06/17





- Critical Care


Critical Care patient: No

## 2017-03-07 NOTE — PN
[id number]





OBJECTIVE:36 year old male with sepsis secondary to RLE cellulitis.








 Vital Signs











 Period  Temp  Pulse  Resp  BP Sys/Shahid  Pulse Ox


 


 Last 24 Hr  98.2 F-98.6 F  86-88  14-20  125-134/70-82  97-99











GENERAL: The patient is awake, alert, and fully oriented, in no acute distress.


HEAD: Normal with no signs of trauma.


EYES: PERRL, extraocular movements intact, sclera anicteric, conjunctiva clear. 

No ptosis. 


ENT: Ears normal, nares patent, oropharynx clear without exudates, moist mucous 


membranes.


NECK: Trachea midline, full range of motion, supple. 


LUNGS: Breath sounds equal, clear to auscultation bilaterally, no wheezes, no 

crackles, no 


accessory muscle use. 


HEART: Regular rate and rhythm, S1, S2 without murmur, rub or gallop.


ABDOMEN: Soft, nontender, nondistended, normoactive bowel sounds, no guarding, 

no 


rebound, no hepatosplenomegaly, no masses.


EXTREMITIES: 2+ pulses, warm, well-perfused, no edema. 


right lower extremity: 5 cm erythema much improved no induration no 

lymphangitis noted


NEUROLOGICAL: Cranial nerves II through XII grossly intact. Normal speech, gait 

not 


observed.


PSYCH: Normal mood, normal affect.


SKIN: Warm, dry, normal turgor, no rashes or lesions noted














 Laboratory Results - last 24 hr











  03/06/17 03/06/17 03/06/17





  07:38 07:38 07:38


 


WBC  9.5  


 


RBC  4.70  


 


Hgb  12.7  


 


Hct  39.6  


 


MCV  84.2  


 


MCHC  32.2  


 


RDW  14.2  


 


Plt Count  379  D  


 


MPV  7.9  


 


Neutrophils %  63.7  


 


Lymphocytes %  24.6  D  


 


Monocytes %  8.7  


 


Eosinophils %  2.5  D  


 


Basophils %  0.5  


 


INR   


 


Sodium   135 L 


 


Potassium   3.9 


 


Chloride   105 


 


Carbon Dioxide   23 


 


Anion Gap   7 L 


 


BUN   6 L 


 


Creatinine   0.8 


 


Creat Clearance w eGFR   > 60 


 


Random Glucose   90 


 


Calcium   8.3 L 


 


Total Bilirubin   0.5  D 


 


AST   18 


 


ALT   33  D 


 


Alkaline Phosphatase   69 


 


Total Protein   6.7 


 


Albumin   3.0 L D 


 


Vancomycin Trough    8.019














  03/06/17





  07:38


 


WBC 


 


RBC 


 


Hgb 


 


Hct 


 


MCV 


 


MCHC 


 


RDW 


 


Plt Count 


 


MPV 


 


Neutrophils % 


 


Lymphocytes % 


 


Monocytes % 


 


Eosinophils % 


 


Basophils % 


 


INR  1.12


 


Sodium 


 


Potassium 


 


Chloride 


 


Carbon Dioxide 


 


Anion Gap 


 


BUN 


 


Creatinine 


 


Creat Clearance w eGFR 


 


Random Glucose 


 


Calcium 


 


Total Bilirubin 


 


AST 


 


ALT 


 


Alkaline Phosphatase 


 


Total Protein 


 


Albumin 


 


Vancomycin Trough 








Active Medications











Generic Name Dose Route Start Last Admin





  Trade Name Freq  PRN Reason Stop Dose Admin


 


Acetaminophen  650 mg 03/03/17 19:55 03/05/17 16:01





  Tylenol -  PO   650 mg





  Q6H PRN   Administration





  FEVER OR PAIN   


 


Heparin Sodium (Porcine)  5,000 unit 03/03/17 22:00 03/07/17 05:17





  Heparin -  SQ   5,000 unit





  TID MELA   Administration


 


Cefazolin Sodium/Dextrose  50 mls @ 100 mls/hr 03/06/17 15:00 03/07/17 02:11





  Ancef 2 Gm Premixed Ivpb -  IVPB   100 mls/hr





  Q6H-IV MELA   Administration


 


Oxycodone HCl  5 mg 03/04/17 08:50 03/05/17 11:39





  Roxicodone -  PO   5 mg





  Q6H PRN   Administration





  PAIN   








 Microbiology





03/03/17 16:24   Nasopharyngeal Swab   Respiratory Virus Panel - Preliminary


03/03/17 15:00   Blood - Peripheral Venous   Blood Culture - Preliminary


                            NO GROWTH OBTAINED AFTER 72 HOURS, INCUBATION TO 

CONTINUE


                            FOR 2 DAYS.


03/03/17 15:07   Blood - Peripheral Venous   Blood Culture - Preliminary


                            NO GROWTH OBTAINED AFTER 72 HOURS, INCUBATION TO 

CONTINUE


                            FOR 2 DAYS.


03/04/17 14:22   Blood - Peripheral Venous   Blood Culture - Preliminary


                            NO GROWTH OBTAINED AFTER 48 HOURS, INCUBATION TO 

CONTINUE


                            FOR 3 DAYS.


03/04/17 14:25   Blood - Peripheral Venous   Blood Culture - Preliminary


                            NO GROWTH OBTAINED AFTER 48 HOURS, INCUBATION TO 

CONTINUE


                            FOR 3 DAYS.


03/03/17 16:24   Urine - Urine Clean Catch   Urine Culture - Final


                            NO GROWTH OBTAINED


03/03/17 16:24   Nasopharyngeal Swab   Influenza Types A,B Antigen (MARS) - Final

, negative


03/03/17 16:24   Nasopharyngeal Swab    - Final








ASSESSMENT/PLAN:





1. ID: Sepsis likely secondary to RLE cellulitis


-Continue Vancomycin 1.5g bid and Unasyn 3g q6h -->transition to PO Abx tomm. 


- blood cultures ntd 


-INR is mildly prolonged, follow 


-RLE Duplex is negative for DVT, d-dimer is within normal limits





2. Morbid Obesity


-Dietary counseling





4. FEN


- low fat diet 


-Replete electrolytes as indicated





5. DVT Prophylaxis


- OOB


- Heparin SQ





Code Status: Full Code








Visit type





- Emergency Visit


Emergency Visit: Yes


ED Registration Date: 03/03/17


Care time: The patient presented to the Emergency Department on the above date 

and was hospitalized for further evaluation of their emergent condition.





- New Patient


This patient is new to me today: No





- Critical Care


Critical Care patient: No





- Discharge Referral


Referred to [facility] No

## 2017-03-07 NOTE — PN
Progress Note, Physician


History of Present Illness: 


Reports less leg pain


Weight bearing and ambulatory without pain


Temps down-afebrile


WBC WNL





- Current Medication List


Current Medications: 


Active Medications





Acetaminophen (Tylenol -)  650 mg PO Q6H PRN


   PRN Reason: FEVER OR PAIN


   Last Admin: 03/05/17 16:01 Dose:  650 mg


Heparin Sodium (Porcine) (Heparin -)  5,000 unit SQ TID MELA


   Last Admin: 03/07/17 05:17 Dose:  5,000 unit


Cefazolin Sodium/Dextrose (Ancef 2 Gm Premixed Ivpb -)  50 mls @ 100 mls/hr 

IVPB Q6H-IV MELA


   Last Admin: 03/07/17 08:39 Dose:  100 mls/hr


Oxycodone HCl (Roxicodone -)  5 mg PO Q6H PRN


   PRN Reason: PAIN


   Last Admin: 03/05/17 11:39 Dose:  5 mg











- Objective


Vital Signs: 


 Vital Signs











Temperature  98.6 F   03/07/17 06:49


 


Pulse Rate  87   03/07/17 06:49


 


Respiratory Rate  20   03/07/17 06:49


 


Blood Pressure  134/82   03/07/17 06:49


 


O2 Sat by Pulse Oximetry (%)  99   03/07/17 06:49











Constitutional: Yes: No Distress, Obese


Eyes: Yes: Conjunctiva Clear


Cardiovascular: Yes: Regular Rate and Rhythm, S1, S2


Respiratory: Yes: CTA Bilaterally


Gastrointestinal: Yes: Normal Bowel Sounds, Soft, Abdomen, Obese.  No: 

Tenderness


Extremities: Yes: Other (area of erythema R LE   No warmth / tenderness)


Labs: 


 CBC, BMP





 03/07/17 07:00 





 03/07/17 08:40 





 INR, PTT











INR  1.12  (0.82-1.09)   03/06/17  07:38    














Assessment/Plan


Recurrent R LE cellulitis


Possible sepsis secondary to cellulitis


Morbid obesity


May substitute  keflex 1gm po tid x 7d


Pt instructed to keep R LE elevated

## 2017-03-08 NOTE — DS
Physical Exam: 


SUBJECTIVE: Patient seen and examined








OBJECTIVE:





 Vital Signs











 Period  Temp  Pulse  Resp  BP Sys/Shahid  Pulse Ox


 


 Last 24 Hr  98.4 F-98.6 F  80-91  20-20  131-143/84-91  








PHYSICAL EXAM





GENERAL: The patient is awake, alert, and fully oriented, in no acute distress.


HEAD: Normal with no signs of trauma.


EYES: PERRL, extraocular movements intact, sclera anicteric, conjunctiva clear. 


ENT: Ears normal, nares patent, oropharynx clear without exudates, moist mucous 

membranes.


NECK: Trachea midline, full range of motion, supple. 


LUNGS: Breath sounds equal, clear to auscultation bilaterally, no wheezes, no 

crackles, no accessory muscle use. 


HEART: Regular rate and rhythm, S1, S2 without murmur, rub or gallop.


ABDOMEN: Soft, nontender, nondistended, normoactive bowel sounds, no guarding, 

no rebound, no hepatosplenomegaly, no masses.


EXTREMITIES: 2+ pulses, warm, well-perfused, no edema. 


NEUROLOGICAL: Cranial nerves II through XII grossly intact. Normal speech, gait 

not observed.


PSYCH: Normal mood, normal affect.


SKIN: Warm, dry, normal turgor, no rashes or lesions noted.





LABS





HOSPITAL COURSE:





Date of Admission:03/03/17





Date of Discharge: 03/08/17














Discharge Summary


Reason For Visit: RIGHT LEG CELLULITIS


Current Active Problems





Cellulitis of right lower extremity (Acute) 


DVT prophylaxis (Acute) 


Morbid (severe) obesity due to excess calories (Acute) 


Sepsis (Acute) 








Condition: Stable





- Instructions


Referrals: 


Moe Collado MD [Primary Care Provider] - 





- Home Medications


Comprehensive Discharge Medication List: 


Ambulatory Orders





NK [No Known Home Medication]  03/03/17

## 2017-10-23 NOTE — PN
Progress Note, Physician


History of Present Illness: 


Awake, alert


Complains of R LE pain with 


  weight bearing or ambulation


Temps down- afebrile   WBC improved


Blood cultures negative


Tolerating antibiotics





- Current Medication List


Current Medications: 


Active Medications





Acetaminophen (Tylenol -)  650 mg PO Q6H PRN


   PRN Reason: FEVER OR PAIN


   Last Admin: 03/05/17 16:01 Dose:  650 mg


Heparin Sodium (Porcine) (Heparin -)  5,000 unit SQ TID [facility]


   Last Admin: 03/06/17 06:16 Dose:  5,000 unit


Sodium Chloride (Normal Saline -)  1,000 mls @ 100 mls/hr IV ASDIR [facility]


   Last Admin: 03/05/17 21:17 Dose:  100 mls/hr


Vancomycin HCl 1,500 mg/ (Dextrose)  250 mls @ 166.667 mls/hr IVPB Q12H [facility]


   Last Admin: 03/05/17 22:28 Dose:  166.667 mls/hr


Ampicillin Sodium/Sulbactam (Sodium 3 gm/ Sodium Chloride)  100 mls @ 200 mls/

hr IVPB Q6H-IV [facility]


   Last Admin: 03/06/17 03:15 Dose:  200 mls/hr


Oxycodone HCl (Roxicodone -)  5 mg PO Q6H PRN


   PRN Reason: PAIN


   Last Admin: 03/05/17 11:39 Dose:  5 mg











- Objective


Vital Signs: 


 Vital Signs











Temperature  98.5 F   03/06/17 03:00


 


Pulse Rate  86   03/06/17 03:00


 


Respiratory Rate  17   03/06/17 07:58


 


Blood Pressure  136/82   03/06/17 03:00


 


O2 Sat by Pulse Oximetry (%)  98   03/06/17 07:58











Constitutional: Yes: No Distress, Obese


Eyes: Yes: Conjunctiva Clear


Cardiovascular: Yes: Regular Rate and Rhythm, S1, S2


Respiratory: Yes: CTA Bilaterally


Gastrointestinal: Yes: Normal Bowel Sounds, Soft, Abdomen, Obese.  No: 

Tenderness


Extremities: Yes: Other (+ area of erythema/ warmth/ tenderness distal R LE no 

lymphangitis)


Labs: 


 INR, PTT











INR  1.27  (0.82-1.09)  H  03/05/17  07:00    














Assessment/Plan


Recurrent R LE cellulitis


Possible sepsis secondary to cellulitis


Morbid obesity


Substitute cefazolin 2gm IVPB q6h


Elevation, analgesics Kidney stone on left side

## 2018-05-31 NOTE — HP
CHIEF COMPLAINT: Right Leg Swelling, Pain





PCP: 





HISTORY OF PRESENT ILLNESS:


This is a 38 y/o man with a PMHx of RLE Cellulitis, Severe Obesity. Who 

presents to the ED with pain, swelling, redness to his RLE x1 day. Patient 

reports warmth and streaking up the leg. Patient endorses fever, chills and 

shaking, lightheadedness, dyspnea. Patient denies recent outdoor exposure, 

animal or bug bites. Patient reports difficulty ambulating subsequently due to 

the pain. 





ER course was notable for:


(1) Sepsis Criteria Met: T Max 103, P 122, WBC 16.8 with L shift (neutrophils 92

)


(2) Vancomycin and Ceftriaxone given


(3) 





Recent Travel: None





PAST MEDICAL HISTORY:


See HPI





PAST SURGICAL HISTORY:


None





Social History:


Smoking: Never


Alcohol:  None


Drugs:   None





Family History:


Brother: DM





Allergies





No Known Allergies Allergy (Verified 05/31/18 15:56)


 








HOME MEDICATIONS:


 Home Medications











 Medication  Instructions  Recorded


 


NK [No Known Home Medication]  05/31/18








REVIEW OF SYSTEMS


CONSTITUTIONAL: fever, chills,


Absent:  diaphoresis, generalized weakness, malaise, loss of appetite, weight 

change


HEENT: 


Absent:  rhinorrhea, nasal congestion, throat pain, throat swelling, difficulty 

swallowing, mouth swelling, ear pain, eye pain, visual changes


CARDIOVASCULAR: 


Absent: chest pain, syncope, palpitations, irregular heart rate, lightheadedness

, peripheral edema


RESPIRATORY:  shortness of breath


Absent: cough, dyspnea with exertion, orthopnea, wheezing, stridor, hemoptysis


GASTROINTESTINAL:


Absent: abdominal pain, abdominal distension, nausea, vomiting, diarrhea, 

constipation, melena, hematochezia


GENITOURINARY: 


Absent: dysuria, frequency, urgency, hesitancy, hematuria, flank pain, genital 

pain


MUSCULOSKELETAL: myalgia, arthralgia, joint swelling 


Absent: back pain, neck pain


SKIN: 


Absent: rash, itching, pallor


HEMATOLOGIC/IMMUNOLOGIC: 


Absent: easy bleeding, easy bruising, lymphadenopathy, frequent infections


ENDOCRINE:


Absent: unexplained weight gain, unexplained weight loss, heat intolerance, 

cold intolerance


NEUROLOGIC: 


Absent: headache, focal weakness or paresthesias, dizziness, unsteady gait, 

seizure, mental status changes, bladder or bowel incontinence


PSYCHIATRIC: 


Absent: anxiety, depression, suicidal or homicidal ideation, hallucinations.








PHYSICAL EXAMINATION


 Vital Signs - 24 hr











  05/31/18 05/31/18 05/31/18





  15:55 20:00 21:26


 


Temperature 103.0 F H  102.6 F H


 


Pulse Rate 122 H  


 


Pulse Rate [  108 H 





Left Radial]   


 


Respiratory 30 H 18 





Rate   


 


Blood Pressure 111/67  


 


Blood Pressure  121/72 





[Right Arm]   


 


O2 Sat by Pulse 100 99 





Oximetry (%)   











GENERAL: Severe obesity, awake, alert, and fully oriented, in no acute distress.


HEAD: Normal with no signs of trauma.


EYES: Pupils equal, round and reactive to light, extraocular movements intact, 

sclera anicteric, conjunctiva clear. No lid lag.


EARS, NOSE, THROAT: Ears normal, nares patent, oropharynx clear without 

exudates. Dry mucous membranes.


NECK: Normal range of motion, supple without lymphadenopathy, JVD, or masses.


LUNGS: Breath sounds equal, clear to auscultation bilaterally. No wheezes, and 

no crackles. No accessory muscle use.


HEART: Regular rate and rhythm, normal S1 and S2 without murmur, rub or gallop.


ABDOMEN: Soft, nontender, not distended, normoactive bowel sounds, no guarding, 

no rebound, no masses.  No hepatomegaly or  splenomegaly. 


MUSCULOSKELETAL:  RLE tenderness. Normal range of motion at all joints. No bony 

deformities. No CVA tenderness.


UPPER EXTREMITIES: 2+ pulses, warm, well-perfused. No cyanosis. No clubbing. No 

peripheral edema.


LOWER EXTREMITIES: +3 R> L peripheral edema. TN to palpation. 2+ pulses, warm, 

well-perfused. No calf tenderness. 


NEUROLOGICAL:  Cranial nerves II-XII intact. Normal speech. Gait not observed.


PSYCHIATRIC: Cooperative. Good eye contact. Appropriate mood and affect.


SKIN:+erythema, edema, warmth from R-ankle to mid-calf noted Warm, dry, normal 

turgor, no rashes. normal capillary refill. 





 Laboratory Results - last 24 hr











  05/31/18 05/31/18 05/31/18





  17:35 17:35 17:35


 


WBC  16.8 H D  


 


RBC  5.03  


 


Hgb  14.1  


 


Hct  42.3  


 


MCV  84.1  


 


MCH  27.9  


 


MCHC  33.2  


 


RDW  14.4  


 


Plt Count  413  


 


MPV  7.4 L  


 


Neutrophils %  No Result Required.  


 


Neutrophils % (Manual)  92.0 H*  


 


Band Neutrophils %  3.0  


 


Lymphocytes %  No Result Required.  


 


Lymphocytes % (Manual)  3.0 L  


 


Monocytes % (Manual)  2 L  


 


Platelet Estimate  Slt increase  


 


Platelet Comment  Few large plts.  


 


PT with INR   14.7 H 


 


INR   1.32 H 


 


PTT (Actin FS)   31.5 


 


VBG pH   


 


POC VBG pCO2   


 


POC VBG pO2   


 


Mixed VBG HCO3   


 


Sodium   


 


Potassium   


 


Chloride   


 


Carbon Dioxide   


 


Anion Gap   


 


BUN   


 


Creatinine   


 


Creat Clearance w eGFR   


 


Random Glucose   


 


Lactic Acid   


 


Calcium   


 


Total Bilirubin   


 


AST   


 


ALT   


 


Alkaline Phosphatase   


 


Troponin I   


 


Total Protein   


 


Albumin   


 


Urine Color    Yellow


 


Urine Appearance    Clear


 


Urine pH    8.0


 


Ur Specific Gravity    1.020


 


Urine Protein    Negative


 


Urine Glucose (UA)    Negative


 


Urine Ketones    Negative


 


Urine Blood    Trace-intact H


 


Urine Nitrite    Negative


 


Urine Bilirubin    Negative


 


Urine Urobilinogen    1.0


 


Ur Leukocyte Esterase    Negative


 


Urine RBC    2-5


 


Urine WBC    0-2


 


Urine Bacteria    Few














  05/31/18 05/31/18 05/31/18





  17:35 17:35 17:35


 


WBC   


 


RBC   


 


Hgb   


 


Hct   


 


MCV   


 


MCH   


 


MCHC   


 


RDW   


 


Plt Count   


 


MPV   


 


Neutrophils %   


 


Neutrophils % (Manual)   


 


Band Neutrophils %   


 


Lymphocytes %   


 


Lymphocytes % (Manual)   


 


Monocytes % (Manual)   


 


Platelet Estimate   


 


Platelet Comment   


 


PT with INR   


 


INR   


 


PTT (Actin FS)   


 


VBG pH  7.46 H  


 


POC VBG pCO2  34.8 L  


 


POC VBG pO2  28.0  


 


Mixed VBG HCO3  24.3  


 


Sodium   129 L 


 


Potassium   3.8 


 


Chloride   98 


 


Carbon Dioxide   22 


 


Anion Gap   9 


 


BUN   10  D 


 


Creatinine   1.2 


 


Creat Clearance w eGFR   > 60 


 


Random Glucose   98 


 


Lactic Acid    2.0


 


Calcium   8.7 


 


Total Bilirubin   0.6 


 


AST   18 


 


ALT   18  D 


 


Alkaline Phosphatase   73 


 


Troponin I   


 


Total Protein   7.5 


 


Albumin   3.8  D 


 


Urine Color   


 


Urine Appearance   


 


Urine pH   


 


Ur Specific Gravity   


 


Urine Protein   


 


Urine Glucose (UA)   


 


Urine Ketones   


 


Urine Blood   


 


Urine Nitrite   


 


Urine Bilirubin   


 


Urine Urobilinogen   


 


Ur Leukocyte Esterase   


 


Urine RBC   


 


Urine WBC   


 


Urine Bacteria   














  05/31/18 05/31/18





  17:35 21:07


 


WBC  


 


RBC  


 


Hgb  


 


Hct  


 


MCV  


 


MCH  


 


MCHC  


 


RDW  


 


Plt Count  


 


MPV  


 


Neutrophils %  


 


Neutrophils % (Manual)  


 


Band Neutrophils %  


 


Lymphocytes %  


 


Lymphocytes % (Manual)  


 


Monocytes % (Manual)  


 


Platelet Estimate  


 


Platelet Comment  


 


PT with INR  


 


INR  


 


PTT (Actin FS)  


 


VBG pH  


 


POC VBG pCO2  


 


POC VBG pO2  


 


Mixed VBG HCO3  


 


Sodium  


 


Potassium  


 


Chloride  


 


Carbon Dioxide  


 


Anion Gap  


 


BUN  


 


Creatinine  


 


Creat Clearance w eGFR  


 


Random Glucose  


 


Lactic Acid   1.9


 


Calcium  


 


Total Bilirubin  


 


AST  


 


ALT  


 


Alkaline Phosphatase  


 


Troponin I  < 0.03 


 


Total Protein  


 


Albumin  


 


Urine Color  


 


Urine Appearance  


 


Urine pH  


 


Ur Specific Gravity  


 


Urine Protein  


 


Urine Glucose (UA)  


 


Urine Ketones  


 


Urine Blood  


 


Urine Nitrite  


 


Urine Bilirubin  


 


Urine Urobilinogen  


 


Ur Leukocyte Esterase  


 


Urine RBC  


 


Urine WBC  


 


Urine Bacteria  











ASSESSMENT/PLAN:


This is a 38 y/o man with Severe Morbid Obesity, Chronic RLE Cellulitis. Placed 

in Observation Sepsis secondary to RLE Cellulitis Hyponatremia.








Plan: 


Place in Observation Sepsis secondary to RLE Cellulitis, Acute Hyponatremia


 Sepsis Criteria MET-Leukocytosis, L-shift, T Max 103


Blood Cultures-pending


Vancomycin, Ceftriaxone given in ED


Will continue Vancomcyin, add Unasyn for Staph, Anaerobic coverage


Appreciate ID consult


Vanco Trough in am


Monitor vitals


Wells Score 2


Duplex of RLE r/o DVT


Elevate extremity


Oxycodone prn pain


Tylenol prn


Na Deficit-1306


Repleted with NS 1L in ED


Monitor CBC, BMP


DVT ppx- OOB, Heparin SQ





Code Status: Full Code








Dispo: Requires in patient care














Problem List





- Problem


(1) Sepsis


Code(s): A41.9 - SEPSIS, UNSPECIFIED ORGANISM   





(2) Cellulitis of right lower extremity


Code(s): L03.115 - CELLULITIS OF RIGHT LOWER LIMB   





(3) Morbid (severe) obesity due to excess calories


Code(s): E66.01 - MORBID (SEVERE) OBESITY DUE TO EXCESS CALORIES   





(4) Hyponatremia


Code(s): E87.1 - HYPO-OSMOLALITY AND HYPONATREMIA   





(5) DVT prophylaxis


Code(s): UYR9326 -    





Visit type





- Emergency Visit


Emergency Visit: Yes


ED Registration Date: 05/31/18


Care time: The patient presented to the Emergency Department on the above date 

and was hospitalized for further evaluation of their emergent condition.





- New Patient


This patient is new to me today: Yes


Date on this admission: 05/31/18





- Critical Care


Critical Care patient: No





Hospitalist Screening





- Colonoscopy Questionnaire


Colonoscopy Questionnaire: 





Colonoscopy Questionnaire








-   Patient:


50 - 75 years old and never had a screening colonoscopy: No


History of colon or rectal polyps, or CA: No


History of IBD, Crohn's disease or UC: No


History of abdominal radiation therapy as a child: No





-   Relative:


1 with colon or rectal CA, or polyps at age 60 or younger: No


Colon or rectal CA diagnosed at age 45 or younger: No


Multiple relatives with colon or rectal CA: No





-   Outcome:


Screening Result: Negative Screen

## 2018-05-31 NOTE — PDOC
History of Present Illness





- General


History Source: Patient, Old Records


Exam Limitations: No Limitations





- History of Present Illness


Initial Comments: 





05/31/18 16:25


The patient is a 37 year old male with a significant past medical history of 

cellulitic infections to the leg who presents to the emergency department 

complaining for evaluation of right leg pain. The patient reports worsening 

right leg pain since yesterday. He describes the pain is localized on the right 

leg between ankle and calf. The patient reports associated symptoms of 

lightheadedness, feeling dehydrated fever, chills, and dyspnea secondary to 

chills. He reports a history of previous infections on the right leg which he 

states occurs once every 4 years. The patient denies taking any medication 

for the symptoms. 





The patient denies chest pain, headache, nausea, vomiting, diarrhea, and 

constipation.


Denies dysuria, frequency, urgency, and hematuria.





Allergies: NKDA


Past surgical history: The patient denies. 


Social history: No reported cigarette, alcohol, or drug use.








<Anupam Mendenhall - Last Filed: 05/31/18 16:25>





- General


History Source: Patient, Old Records


Exam Limitations: No Limitations





<Roni Peterson - Last Filed: 05/31/18 18:57>





- General


Chief Complaint: Redness To Affected Area


Stated Complaint: CELLULITIS


Time Seen by Provider: 05/31/18 15:58





Past History





<Anupam Mendenhall - Last Filed: 05/31/18 16:25>





- Past Medical History


Anemia: No


Asthma: No


Cancer: No


Cardiac Disorders: No


CVA: No


COPD: No


CHF: No


Dementia: No


Diabetes: No


GI Disorders: No


 Disorders: No


HTN: No


Hypercholesterolemia: No


Liver Disease: No


Seizures: No


Thyroid Disease: No





- Suicide/Smoking/Psychosocial Hx


Smoking Status: No


Smoking History: Never smoked


Years of Tobacco Use: 0


Number of Cigarettes Smoked Daily: 0


Hx Alcohol Use: No


Drug/Substance Use Hx: No


Substance Use Type: None


Hx Substance Use Treatment: No





<Roni Petersno - Last Filed: 05/31/18 18:57>





- Past Medical History


Allergies/Adverse Reactions: 


 Allergies











Allergy/AdvReac Type Severity Reaction Status Date / Time


 


No Known Allergies Allergy   Verified 05/31/18 15:56











Home Medications: 


Ambulatory Orders





NK [No Known Home Medication]  05/31/18 











**Review of Systems





- Review of Systems


Able to Perform ROS?: Yes


Comments:: 


GENERAL/CONSTITUTIONAL: (+)Fever. (+)Chills. No weakness.


HEAD, EYES, EARS, NOSE AND THROAT: No change in vision. No ear pain or 

discharge. No sore throat.


CARDIOVASCULAR: (+)Shortness of breath. No chest pain. 


RESPIRATORY: No cough, wheezing, or hemoptysis.


GASTROINTESTINAL: No nausea, vomiting, diarrhea or constipation.


GENITOURINARY: No dysuria, frequency, or change in urination.


MUSCULOSKELETAL: (+)Right leg pain. No joint or muscle swelling. No neck or 

back pain.


SKIN: No rash


NEUROLOGIC: No headache, vertigo, loss of consciousness, or change in strength/

sensation.


ENDOCRINE: (+)increased thirst. No abnormal weight change.


HEMATOLOGIC/LYMPHATIC: No anemia, easy bleeding, or history of blood clots.


ALLERGIC/IMMUNOLOGIC: No hives or skin allergy.








<Anupam Mendenhall - Last Filed: 05/31/18 16:25>





*Physical Exam





- Vital Signs


 Last Vital Signs











Temp Pulse Resp BP Pulse Ox


 


 103.0 F H  122 H  30 H  111/67   100 


 


 05/31/18 15:55  05/31/18 15:55  05/31/18 15:55  05/31/18 15:55  05/31/18 15:55














- Physical Exam


Comments: 


GENERAL: (+)Obese. (+)Mildly tachypneic. Awake, alert, and fully oriented, in 

no acute distress


HEAD: No signs of trauma


EYES: PERRLA, EOMI, sclera anicteric, conjunctiva clear


ENT: Auricles normal inspection, hearing grossly normal, nares patent, Moist 

mucosa


NECK: Normal ROM, supple, no JVD, or masses


LUNGS: Breath sounds equal, clear to auscultation bilaterally.  No wheezes, and 

no crackles


HEART: Regular rate and rhythm, normal S1 and S2, no murmurs, rubs or gallops


ABDOMEN: Soft, nontender. No guarding, no rebound.  No masses


EXTREMITIES: (+)8x10cm erythema along distal medial portion of right leg, 

tender to palpation. 


NEUROLOGICAL: Cranial nerves II through XII grossly intact.  Normal speech.


SKIN: Warm, Dry, normal turgor. No crepitus. No rashes or lesions noted.








<Anupam Mendenhall - Last Filed: 05/31/18 16:25>





**Heart Score/ECG Review


  ** #1


ECG reviewed & interpreted by me at: 17:15





05/31/18 17:35


, no std/melina, normal axis, normal intervals,  msec





<KristenRoni - Last Filed: 05/31/18 18:57>





ED Treatment Course





- LABORATORY


CBC & Chemistry Diagram: 


 05/31/18 17:35





 05/31/18 17:35





<Roni Peterson - Last Filed: 05/31/18 18:57>





Medical Decision Making





- Medical Decision Making





05/31/18 16:08





A portion of this note was documented by scribe services under my direction. I 

have reviewed the details of the note, within reason, and agree with the 

documentation with the following case summary and management plan written by 

me. 





Patient treated in the ED.





Nursing notes are reviewed and incorporated into the medical decision-making.


Vital signs reviewed.





Peripheral IV access obtained by the nurse, laboratory studies are drawn and 

sent, reviewed and interpreted by myself. 





 Vital Signs











Temp Pulse Resp BP Pulse Ox


 


 103.0 F H  122 H  30 H  111/67   100 


 


 05/31/18 15:55  05/31/18 15:55  05/31/18 15:55  05/31/18 15:55  05/31/18 15:55








37 year old male with past medical history of obesity and reoccurent RLE 

cellulitis p/w RLE cellulitis.


The patient was in his usual state of health yesterday. Noted today, this 

morning, felt redness and warmth and tactile fevers, similar to his prior 

cellulitis.


Pt is unsure of what is the inciting factor.


States that he woke up and felt a distal medial erythema similar to prior 

incident in 2017 (which is noted in documentation).


Denies chest pain, SOB, abdominal pain, nausea, vomiting, diarrhea, dysuria.


Denies unilateral calf swelling.





Findings are consistent with cellulitis. Given fever, sepsis workup initiated.


There is no crepitus, and at least of now, does not appear to be necrotizing 

fasciitis. Will need to make careful observation.


Empiric antibiotics, IVF, cultures.


Low threshold for admission.


05/31/18 18:54





 CBC, BMP





 05/31/18 17:35 





 05/31/18 17:35 





 CMP











Sodium  129 mmol/L (136-145)  L  05/31/18  17:35    


 


Potassium  3.8 mmol/L (3.5-5.1)   05/31/18  17:35    


 


Chloride  98 mmol/L ()   05/31/18  17:35    


 


Carbon Dioxide  22 mmol/L (22-28)   05/31/18  17:35    


 


Anion Gap  9  (8-16)   05/31/18  17:35    


 


BUN  10 mg/dl (7-18)  D 05/31/18  17:35    


 


Creatinine  1.2 mg/dl (0.6-1.3)   05/31/18  17:35    


 


Creat Clearance w eGFR  > 60  (>60)   05/31/18  17:35    


 


Random Glucose  98 mg/dl ()   05/31/18  17:35    


 


Calcium  8.7 mg/dl (8.4-10.2)   05/31/18  17:35    


 


Total Bilirubin  0.6 mg/dl (0.2-1.0)   05/31/18  17:35    


 


AST  18 U/L (10-42)   05/31/18  17:35    


 


ALT  18 U/L (10-40)  D 05/31/18  17:35    


 


Alkaline Phosphatase  73 U/L (32-92)   05/31/18  17:35    


 


Troponin I  < 0.03 ng/ml (0.00-0.06)   05/31/18  17:35    


 


Total Protein  7.5 g/dl (6.4-8.3)   05/31/18  17:35    


 


Albumin  3.8 g/dl (3.5-5.0)  D 05/31/18  17:35    








WBC 16.8. Ceftriaxone and vancomycin ordered.


Will admit patient to the hospital for further management and disposition.





<Roni Peterson - Last Filed: 05/31/18 18:57>





*DC/Admit/Observation/Transfer





- Attestations


Scribe Attestion: 


Documentation prepared by Anupam Mendenhall, acting as medical scribe for Roni Peterson MD.





<Anupam Mendenhall - Last Filed: 05/31/18 16:25>





- Discharge Dispostion


Decision to Admit order: Yes





<Roni Peterson - Last Filed: 05/31/18 18:57>


Diagnosis at time of Disposition: 


 Cellulitis of right lower extremity








- Discharge Dispostion


Condition at time of disposition: Fair

## 2018-06-01 NOTE — CONS
DATE OF CONSULTATION:

 

DATE OF DICTATION:  06/01/2018

 

HISTORY:  The patient is a 37-year-old male with a history of morbid obesity, prior

history of cellulitis of the right lower extremity now readmitted with recurrent

right lower extremity cellulitis.  He reports a 1- to 2-day history of increasing

right lower extremity pain, erythema, and warmth.  Pain was worsened with ambulation.

 He noted erythema extending from the ankle to the calf.  The patient presented to

the emergency room where he was diagnosed with cellulitis of the right lower

extremity.  He was empirically treated with vancomycin and Unasyn.  His course has

been complicated by high-grade fever.  The patient denies any traumatic injury to his

right lower extremity.  No insect or animal bites or scratches.  He has had a history

of recurrent right lower extremity cellulitis.  The patient was hospitalized in March 2017 with right leg cellulitis and fever.  He also had a history of right lower

extremity cellulitis, which was complicated by group B streptococcus bacteremia in

2010.

 

PAST MEDICAL HISTORY:  Positive for morbid obesity.

 

ALLERGIES:  No known allergies.

 

MEDICATIONS:  Include Tylenol, vancomycin, Unasyn, oxycodone.

 

SOCIAL HISTORY:  Resides at home.  Nonsmoker, nondrinker.

 

SYSTEMS REVIEW:

Neurologic:  No loss of consciousness, seizure activity, focal weakness.

Cardiac:  Negative chest pain or palpitations.

Respiratory:  Negative cough or sputum production.  

Gastrointestinal:  Negative vomiting or diarrhea.

Genitourinary:  Negative for urinary tract infection.

 

LABORATORY DATA:  White blood cell count 15.4, hematocrit 36.3, platelet count 310,

BUN 8, creatinine 1.1.  Urinalysis 0-2 white cells.  Cultures pending.

 

PHYSICAL EXAMINATION: 

General:  He is awake, alert, morbidly obese.  

Vital Signs:  Temperature 101.3, blood pressure 88/35, pulse 110 and regular,

respirations 18 per minute.

HEENT:  Sclerae anicteric.

Heart:  Sounds S1, S2.  

Lungs:  Clear. 

Abdomen:  Obese, soft, nontender.  

Extremities:  Bilateral lower extremity edema.  There is erythema present on the

medial aspect of the distal right lower extremity from above the ankle to the

midcalf.  The area is warm and tender to touch.  No crepitus or fluctuance.  No

lymphangitic streaking.

 

IMPRESSION: 

1.  Recurrent right lower extremity cellulitis.

2.  High-grade fever, leukocytosis, rule out sepsis.

3.  Morbid obesity.

 

PLAN:  Pending sepsis workup empiric antibiotic coverage with vancomycin and Unasyn. 

Elevation.  Analgesics.  We will follow.  

 

Thank you for the kind referral.

 

 

 

ARLIN KEARNS M.D.

 

[id number]

DD: 06/01/2018 11:41

DT: 06/01/2018 12:38

Job #:  [id number]

## 2018-06-01 NOTE — PN
Physical Exam: 


SUBJECTIVE: Patient seen and examined. He reports he feels chills and pain when 

he puts pressure or walking. Febrile








OBJECTIVE:





 Vital Signs











 Period  Temp  Pulse  Resp  BP Sys/Shahid  Pulse Ox


 


 Last 24 Hr  100.7 F-103.0 F  108-122  18-30  /35-83  








PE


Neuro: alert, awake, cn 2-12intact


Pulm: distant, 


CV: s1s2 rrr no mrg


Abd: obese abd, s nt nd + bs


Ext: RLE ankle erythema, hot and tender to touch 














 Laboratory Results - last 24 hr











  05/31/18 05/31/18 05/31/18





  17:35 17:35 17:35


 


WBC  16.8 H D  


 


RBC  5.03  


 


Hgb  14.1  


 


Hct  42.3  


 


MCV  84.1  


 


MCH  27.9  


 


MCHC  33.2  


 


RDW  14.4  


 


Plt Count  413  


 


MPV  7.4 L  


 


Neutrophils %  No Result Required.  


 


Neutrophils % (Manual)  92.0 H*  


 


Band Neutrophils %  3.0  


 


Lymphocytes %  No Result Required.  


 


Lymphocytes % (Manual)  3.0 L  


 


Monocytes %   


 


Monocytes % (Manual)  2 L  


 


Eosinophils %   


 


Basophils %   


 


Platelet Estimate  Slt increase  


 


Platelet Comment  Few large plts.  


 


PT with INR   14.7 H 


 


INR   1.32 H 


 


PTT (Actin FS)   31.5 


 


VBG pH   


 


POC VBG pCO2   


 


POC VBG pO2   


 


Mixed VBG HCO3   


 


Sodium   


 


Potassium   


 


Chloride   


 


Carbon Dioxide   


 


Anion Gap   


 


BUN   


 


Creatinine   


 


Creat Clearance w eGFR   


 


Random Glucose   


 


Lactic Acid   


 


Calcium   


 


Total Bilirubin   


 


AST   


 


ALT   


 


Alkaline Phosphatase   


 


Troponin I   


 


Total Protein   


 


Albumin   


 


Urine Color    Yellow


 


Urine Appearance    Clear


 


Urine pH    8.0


 


Ur Specific Gravity    1.020


 


Urine Protein    Negative


 


Urine Glucose (UA)    Negative


 


Urine Ketones    Negative


 


Urine Blood    Trace-intact H


 


Urine Nitrite    Negative


 


Urine Bilirubin    Negative


 


Urine Urobilinogen    1.0


 


Ur Leukocyte Esterase    Negative


 


Urine RBC    2-5


 


Urine WBC    0-2


 


Urine Bacteria    Few














  05/31/18 05/31/18 05/31/18





  17:35 17:35 17:35


 


WBC   


 


RBC   


 


Hgb   


 


Hct   


 


MCV   


 


MCH   


 


MCHC   


 


RDW   


 


Plt Count   


 


MPV   


 


Neutrophils %   


 


Neutrophils % (Manual)   


 


Band Neutrophils %   


 


Lymphocytes %   


 


Lymphocytes % (Manual)   


 


Monocytes %   


 


Monocytes % (Manual)   


 


Eosinophils %   


 


Basophils %   


 


Platelet Estimate   


 


Platelet Comment   


 


PT with INR   


 


INR   


 


PTT (Actin FS)   


 


VBG pH  7.46 H  


 


POC VBG pCO2  34.8 L  


 


POC VBG pO2  28.0  


 


Mixed VBG HCO3  24.3  


 


Sodium   129 L 


 


Potassium   3.8 


 


Chloride   98 


 


Carbon Dioxide   22 


 


Anion Gap   9 


 


BUN   10  D 


 


Creatinine   1.2 


 


Creat Clearance w eGFR   > 60 


 


Random Glucose   98 


 


Lactic Acid    2.0


 


Calcium   8.7 


 


Total Bilirubin   0.6 


 


AST   18 


 


ALT   18  D 


 


Alkaline Phosphatase   73 


 


Troponin I   


 


Total Protein   7.5 


 


Albumin   3.8  D 


 


Urine Color   


 


Urine Appearance   


 


Urine pH   


 


Ur Specific Gravity   


 


Urine Protein   


 


Urine Glucose (UA)   


 


Urine Ketones   


 


Urine Blood   


 


Urine Nitrite   


 


Urine Bilirubin   


 


Urine Urobilinogen   


 


Ur Leukocyte Esterase   


 


Urine RBC   


 


Urine WBC   


 


Urine Bacteria   














  05/31/18 05/31/18 06/01/18





  17:35 21:07 08:55


 


WBC    15.4 H


 


RBC    4.37


 


Hgb    12.9


 


Hct    36.3


 


MCV    83.0


 


MCH    29.4


 


MCHC    35.4


 


RDW    14.0


 


Plt Count    310  D


 


MPV    7.6


 


Neutrophils %    88.9 H D


 


Neutrophils % (Manual)   


 


Band Neutrophils %   


 


Lymphocytes %    6.0 L D


 


Lymphocytes % (Manual)   


 


Monocytes %    5.0


 


Monocytes % (Manual)   


 


Eosinophils %    0.0  D


 


Basophils %    0.1


 


Platelet Estimate   


 


Platelet Comment   


 


PT with INR   


 


INR   


 


PTT (Actin FS)   


 


VBG pH   


 


POC VBG pCO2   


 


POC VBG pO2   


 


Mixed VBG HCO3   


 


Sodium   


 


Potassium   


 


Chloride   


 


Carbon Dioxide   


 


Anion Gap   


 


BUN   


 


Creatinine   


 


Creat Clearance w eGFR   


 


Random Glucose   


 


Lactic Acid   1.9 


 


Calcium   


 


Total Bilirubin   


 


AST   


 


ALT   


 


Alkaline Phosphatase   


 


Troponin I  < 0.03  


 


Total Protein   


 


Albumin   


 


Urine Color   


 


Urine Appearance   


 


Urine pH   


 


Ur Specific Gravity   


 


Urine Protein   


 


Urine Glucose (UA)   


 


Urine Ketones   


 


Urine Blood   


 


Urine Nitrite   


 


Urine Bilirubin   


 


Urine Urobilinogen   


 


Ur Leukocyte Esterase   


 


Urine RBC   


 


Urine WBC   


 


Urine Bacteria   














  06/01/18





  08:55


 


WBC 


 


RBC 


 


Hgb 


 


Hct 


 


MCV 


 


MCH 


 


MCHC 


 


RDW 


 


Plt Count 


 


MPV 


 


Neutrophils % 


 


Neutrophils % (Manual) 


 


Band Neutrophils % 


 


Lymphocytes % 


 


Lymphocytes % (Manual) 


 


Monocytes % 


 


Monocytes % (Manual) 


 


Eosinophils % 


 


Basophils % 


 


Platelet Estimate 


 


Platelet Comment 


 


PT with INR 


 


INR 


 


PTT (Actin FS) 


 


VBG pH 


 


POC VBG pCO2 


 


POC VBG pO2 


 


Mixed VBG HCO3 


 


Sodium  131 L


 


Potassium  3.3 L


 


Chloride  104


 


Carbon Dioxide  23


 


Anion Gap  4 L


 


BUN  8


 


Creatinine  1.1


 


Creat Clearance w eGFR 


 


Random Glucose  112 H


 


Lactic Acid 


 


Calcium  7.9 L


 


Total Bilirubin 


 


AST 


 


ALT 


 


Alkaline Phosphatase 


 


Troponin I 


 


Total Protein 


 


Albumin 


 


Urine Color 


 


Urine Appearance 


 


Urine pH 


 


Ur Specific Gravity 


 


Urine Protein 


 


Urine Glucose (UA) 


 


Urine Ketones 


 


Urine Blood 


 


Urine Nitrite 


 


Urine Bilirubin 


 


Urine Urobilinogen 


 


Ur Leukocyte Esterase 


 


Urine RBC 


 


Urine WBC 


 


Urine Bacteria 








Active Medications











Generic Name Dose Route Start Last Admin





  Trade Name Freq  PRN Reason Stop Dose Admin


 


Acetaminophen  650 mg  06/01/18 01:40  06/01/18 08:08





  Tylenol -  PO   650 mg





  Q6H PRN   Administration





  FEVER   





     





     





     


 


Heparin Sodium (Porcine)  5,000 unit  05/31/18 22:00  06/01/18 06:17





  Heparin -  SQ   5,000 unit





  TID MELA   Administration





     





     





     





     


 


Vancomycin HCl 1,500 mg/  500 mls @ 250 mls/hr  06/01/18 22:00  





  Dextrose  IVPB   





  Q12H MELA   





     





     





  Protocol   





     


 


Ampicillin Sodium/Sulbactam  100 mls @ 200 mls/hr  06/01/18 15:00  





  Sodium 3 gm/ Sodium Chloride  IVPB   





  Q6H-IV MELA   





     





     





     





     


 


Oxycodone HCl  5 mg  06/01/18 10:29  





  Roxicodone -  PO   





  Q6H PRN   





  PAIN LEVEL 1-5   





     





     





     











Assessment: 37 year old male with severe morbid obesity, Chronic RLE Cellulitis 

admitted with sepsis d/t RLE cellulitis. 








Plan: 





1. Severe sepsis 2/2 RLE cellulitis


- s/p 1L IV bolus for hypotension 


- Continue IVF 75cc/hr 


- Continue vanco, unysan 


- Negative for DVT


- Blood and urine cx pending 


- Discussed with ID 


 


2. Hypotension 


- Continue maintenance IVF 


- Monitor bp 





3. Hyponatremia


- Improving 


- Continue fluids 





4. DVT


- Heparin sq  





Visit type





- Emergency Visit


Emergency Visit: Yes


ED Registration Date: 05/31/18


Care time: The patient presented to the Emergency Department on the above date 

and was hospitalized for further evaluation of their emergent condition.





- New Patient


This patient is new to me today: Yes


Date on this admission: 06/01/18





- Critical Care


Critical Care patient: No

## 2018-06-01 NOTE — PN
Progress Note (short form)





- Note


Progress Note: 





ID Consult dictated


Recurrent R LE cellulitis


Fever/ leukocytosis  R/O sepsis secondary to skin source


Morbid obesity


Pending cultures empiric vancomycin / unasyn


Elevation/ analgesics

## 2018-06-01 NOTE — EKG
Test Reason : 

Blood Pressure : ***/*** mmHG

Vent. Rate : 123 BPM     Atrial Rate : 123 BPM

   P-R Int : 142 ms          QRS Dur : 080 ms

    QT Int : 310 ms       P-R-T Axes : 044 006 019 degrees

   QTc Int : 443 ms

 

SINUS TACHYCARDIA

POSSIBLE LEFT ATRIAL ENLARGEMENT

WHEN COMPARED WITH ECG OF 03-MAR-2017 15:57,

NO SIGNIFICANT CHANGE WAS FOUND

Confirmed by ARLIN LOTT MD ([id number]) on 6/1/2018 10:18:19 AM

 

Referred By: DR RIDDLE           Confirmed By:ARLIN LOTT MD

## 2018-06-02 NOTE — PN
Physical Exam: 


SUBJECTIVE: Patient seen and examined at the bedside.


In no acute distress, comfortable. 





OBJECTIVE:


 Vital Signs











 Period  Temp  Pulse  Resp  BP Sys/Shahid  Pulse Ox


 


 Last 24 Hr  99.0 F-101.1 F  100-110  18-20  106-120/57-68  98








GENERAL: The patient is awake, alert, and fully oriented, in no acute distress.


HEAD: Normal with no signs of trauma.


EYES: PERRL, extraocular movements intact, sclera anicteric, conjunctiva clear. 

No ptosis. 


ENT: Ears normal, nares patent, oropharynx clear without exudates, moist mucous 

membranes.


NECK: Trachea midline, full range of motion, supple. 


LUNGS: Breath sounds equal, clear to auscultation bilaterally


HEART: Regular rate and rhythm, S1, S2 without murmur, rub or gallop.


ABDOMEN: Soft, nontender, nondistended, normoactive bowel sounds, no guarding, 

no rebound, no hepatosplenomegaly, no masses.


EXTREMITIES:no edema. 


NEUROLOGICAL: Cranial nerves II through XII grossly intact. Normal speech, gait 

not observed.


PSYCH: Normal mood, normal affect.


SKIN: Warm, dry, normal turgor, no rashes or lesions noted





 Laboratory Results - last 24 hr











  06/01/18 06/01/18 06/02/18





  08:55 08:55 07:20


 


WBC   15.4 H  10.8


 


RBC   4.37  4.48


 


Hgb   12.9  12.9


 


Hct   36.3  37.7


 


MCV   83.0  84.1


 


MCH   29.4  28.7


 


MCHC   35.4  34.1


 


RDW   14.0  14.5


 


Plt Count   310  D  264


 


MPV   7.6  7.5


 


Neutrophils %   88.9 H D 


 


Lymphocytes %   6.0 L D 


 


Monocytes %   5.0 


 


Eosinophils %   0.0  D 


 


Basophils %   0.1 


 


Sodium   


 


Potassium   


 


Chloride   


 


Carbon Dioxide   


 


Anion Gap   


 


BUN   


 


Creatinine   


 


Random Glucose   


 


Calcium   


 


Vancomycin Pre-Dose  1.645 L*  














  06/02/18





  07:20


 


WBC 


 


RBC 


 


Hgb 


 


Hct 


 


MCV 


 


MCH 


 


MCHC 


 


RDW 


 


Plt Count 


 


MPV 


 


Neutrophils % 


 


Lymphocytes % 


 


Monocytes % 


 


Eosinophils % 


 


Basophils % 


 


Sodium  135 L


 


Potassium  3.8


 


Chloride  105


 


Carbon Dioxide  24


 


Anion Gap  6 L


 


BUN  7


 


Creatinine  1.1


 


Random Glucose  88  D


 


Calcium  8.1 L


 


Vancomycin Pre-Dose 








Active Medications











Generic Name Dose Route Start Last Admin





  Trade Name Freq  PRN Reason Stop Dose Admin


 


Acetaminophen  650 mg  06/01/18 01:40  06/01/18 19:57





  Tylenol -  PO   650 mg





  Q6H PRN   Administration





  FEVER   





     





     





     


 


Heparin Sodium (Porcine)  5,000 unit  05/31/18 22:00  06/02/18 06:24





  Heparin -  SQ   5,000 unit





  TID MELA   Administration





     





     





     





     


 


Ampicillin Sodium/Sulbactam  100 mls @ 200 mls/hr  06/01/18 15:00  06/02/18 09:

56





  Sodium 3 gm/ Sodium Chloride  IVPB   200 mls/hr





  Q6H-IV MELA   Administration





     





     





     





     


 


Sodium Chloride  1,000 mls @ 75 mls/hr  06/01/18 12:15  06/01/18 12:56





  Normal Saline -  IV   75 mls/hr





  ASDIR MELA   Administration





     





     





     





     


 


Vancomycin HCl 1,500 mg/  500 mls @ 250 mls/hr  06/02/18 10:11  





  Sodium Chloride  IVPB   





  Q12H MELA   





     





     





  Protocol   





     


 


Oxycodone HCl  5 mg  06/01/18 10:29  06/01/18 19:57





  Roxicodone -  PO   5 mg





  Q6H PRN   Administration





  PAIN LEVEL 1-5   





     





     





     











ASSESSMENT/PLAN:





Patient is a 37 year old male with severe morbid obesity admitted for acute on 

chronic RLE cellulitis. 





ID:


Severe sepsis 2/2 RLE cellulitis


On Vanco and Unasyn


No DVT as per vascular report


Pain controlled


RLE cellulitis acute on chronic





Card


Hypotension, likely secondary to sepsis


on IVF


Monitor BP





Renal:


Hyponatremia, resolving


On IVF





DVT: Heparin


GI: deferred


NS @ 75cc/hr





Visit type





- Emergency Visit


Emergency Visit: Yes


ED Registration Date: 05/31/18


Care time: The patient presented to the Emergency Department on the above date 

and was hospitalized for further evaluation of their emergent condition.





- New Patient


This patient is new to me today: Yes


Date on this admission: 06/02/18





- Critical Care


Critical Care patient: No





- Discharge Referral


Referred to [facility] No

## 2018-06-02 NOTE — PN
Progress Note, Physician


History of Present Illness: 





C/O R LE pain


Low grade temp


Tolerating antibiotics





- Current Medication List


Current Medications: 


Active Medications





Acetaminophen (Tylenol -)  650 mg PO Q6H PRN


   PRN Reason: FEVER


   Last Admin: 06/01/18 19:57 Dose:  650 mg


Heparin Sodium (Porcine) (Heparin -)  5,000 unit SQ TID MELA


   Last Admin: 06/02/18 06:24 Dose:  5,000 unit


Vancomycin HCl 1,500 mg/ (Dextrose)  500 mls @ 250 mls/hr IVPB Q12H MELA; 

Protocol


   Last Admin: 06/01/18 22:00 Dose:  250 mls/hr


Ampicillin Sodium/Sulbactam (Sodium 3 gm/ Sodium Chloride)  100 mls @ 200 mls/

hr IVPB Q6H-IV MELA


   Last Admin: 06/02/18 03:03 Dose:  200 mls/hr


Sodium Chloride (Normal Saline -)  1,000 mls @ 75 mls/hr IV ASDIR MELA


   Last Admin: 06/01/18 12:56 Dose:  75 mls/hr


Oxycodone HCl (Roxicodone -)  5 mg PO Q6H PRN


   PRN Reason: PAIN LEVEL 1-5


   Last Admin: 06/01/18 19:57 Dose:  5 mg











- Objective


Vital Signs: 


 Vital Signs











Temperature  99.3 F   06/02/18 06:00


 


Pulse Rate  100 H  06/02/18 06:00


 


Respiratory Rate  20   06/02/18 06:00


 


Blood Pressure  106/57   06/02/18 06:00


 


O2 Sat by Pulse Oximetry (%)  98   06/02/18 06:00











Constitutional: Yes: No Distress, Obese


Eyes: Yes: Conjunctiva Clear


Cardiovascular: Yes: Regular Rate and Rhythm, S1, S2


Respiratory: Yes: CTA Bilaterally


Gastrointestinal: Yes: Normal Bowel Sounds, Soft, Abdomen, Obese.  No: 

Tenderness


Extremities: Yes: Other (R LE erythema/ warmth/ tenderness)


Labs: 


 CBC, BMP





 06/02/18 07:20 





 06/02/18 07:20 





 INR, PTT











INR  1.32  (0.82-1.09)  H  05/31/18  17:35    














Assessment/Plan





Recurrent R LE cellulitis


R/O sepsis secondary to cellulitis


Morbid obesity


Continue empiric  vancomycin/ unasyn


Elevation

## 2018-06-03 NOTE — PN
Physical Exam: 


SUBJECTIVE: Patient seen and examined.


 Still c/o RLE pain with ambulation, denies HA, dizziness, cp, sob,palpitations

, abdominal pain, N/V/D or urinary symptoms.








OBJECTIVE:





 Vital Signs











 Period  Temp  Pulse  Resp  BP Sys/Shahid  Pulse Ox


 


 Last 24 Hr  98.1 F-99.6 F    16-20  114-126/66-75  97-99











GENERAL: The patient is awake, alert, and fully oriented, in no acute distress.


HEAD: Normal with no signs of trauma.


EYES: PERRL, extraocular movements intact, sclera anicteric, conjunctiva clear. 

No ptosis. 


ENT: Ears normal, nares patent, oropharynx clear without exudates, moist mucous 


membranes.


NECK: Trachea midline, full range of motion, supple. 


LUNGS: Breath sounds equal, clear to auscultation bilaterally, no wheezes, no 

crackles, no 


accessory muscle use. 


HEART: Regular rate and rhythm, S1, S2 without murmur, rub or gallop.


ABDOMEN: Soft, non-tender, non-distended, normoactive bowel sounds, no guarding

, no 


rebound, no hepatosplenomegaly, no masses, obese 


EXTREMITIES: 2+ pulses, warm, well-perfused, RLE with mild redness and swelling

, + tenderness,pulse present, warm to touch 


NEUROLOGICAL: Cranial nerves II through XII grossly intact. Normal speech, gait 

not 


observed.


PSYCH: Normal mood, normal affect.


SKIN: Warm, dry, normal turgor, no rashes or lesions noted, RLE cellulitis 














 Laboratory Results - last 24 hr











  06/03/18 06/03/18





  06:45 07:00


 


WBC  8.1 


 


RBC  4.34 


 


Hgb  12.4 


 


Hct  36.5 


 


MCV  84.1 


 


MCH  28.5 


 


MCHC  33.9 


 


RDW  14.2 


 


Plt Count  339  D 


 


MPV  7.7 


 


Neutrophils %  59.0  D 


 


Lymphocytes %  28.4  D 


 


Monocytes %  9.9  D 


 


Eosinophils %  2.2  D 


 


Basophils %  0.5  D 


 


Sodium   136


 


Potassium   3.7


 


Chloride   105


 


Carbon Dioxide   23


 


Anion Gap   8


 


BUN   7


 


Creatinine   0.9


 


Creat Clearance w eGFR   > 60


 


Random Glucose   79


 


Calcium   8.0 L


 


Magnesium   2.0


 


Total Bilirubin   < 0.5


 


AST   16


 


ALT   24  D


 


Alkaline Phosphatase   68


 


Total Protein   6.3 L


 


Albumin   2.8 L D








Active Medications











Generic Name Dose Route Start Last Admin





  Trade Name Russellq  PRN Reason Stop Dose Admin


 


Acetaminophen  650 mg  06/01/18 01:40  06/03/18 05:37





  Tylenol -  PO   650 mg





  Q6H PRN   Administration





  FEVER   





     





     





     


 


Heparin Sodium (Porcine)  5,000 unit  05/31/18 22:00  06/03/18 05:37





  Heparin -  SQ   5,000 unit





  TID MELA   Administration





     





     





     





     


 


Ampicillin Sodium/Sulbactam  100 mls @ 200 mls/hr  06/01/18 15:00  06/03/18 03:

37





  Sodium 3 gm/ Sodium Chloride  IVPB   200 mls/hr





  Q6H-IV MELA   Administration





     





     





     





     


 


Sodium Chloride  1,000 mls @ 75 mls/hr  06/01/18 12:15  06/02/18 13:57





  Normal Saline -  IV   75 mls/hr





  ASDIR MELA   Administration





     





     





     





     


 


Vancomycin HCl 1,500 mg/  500 mls @ 250 mls/hr  06/02/18 10:11  06/02/18 23:47





  Sodium Chloride  IVPB   250 mls/hr





  Q12H MELA   Administration





     





     





  Protocol   





     


 


Oxycodone HCl  5 mg  06/01/18 10:29  06/03/18 05:38





  Roxicodone -  PO   5 mg





  Q6H PRN   Administration





  PAIN LEVEL 1-5   





     





     





     











 Microbiology





05/31/18 17:35   Blood - Peripheral Venous   Blood Culture - Preliminary


                            NO GROWTH OBTAINED AFTER 48 HOURS, INCUBATION TO 

CONTINUE


                            FOR 3 DAYS.


05/31/18 17:35   Blood - Peripheral Venous   Blood Culture - Preliminary


                            NO GROWTH OBTAINED AFTER 48 HOURS, INCUBATION TO 

CONTINUE


                            FOR 3 DAYS.


05/31/18 17:35   Urine - Urine Clean Catch   Urine Culture - Final

















ASSESSMENT/PLAN:


Patient is a 37 year old male with severe morbid obesity, hx of RLE celllulitis 

and eczema, admitted for acute on chronic RLE cellulitis. 





ID:


Severe sepsis 2/2 RLE cellulitis- improving 


will cont on Vanco and Unasyn


No DVT as per vascular report


Pain controlled


RLE cellulitis acute on chronic


BC preliminary negative 


afebrile with no leukocytosis





Card


Hypotension, likely secondary to sepsis- resolved,BP stable 


s/p IVF


Monitor BP closely 





Renal:


*Hyponatremia- resolved 


- s/p IVF 





* Eczema


- started on Triamcinolone 





DVT: Heparin








Visit type





- Emergency Visit


Emergency Visit: Yes


ED Registration Date: 05/31/18


Care time: The patient presented to the Emergency Department on the above date 

and was hospitalized for further evaluation of their emergent condition.





- New Patient


This patient is new to me today: Yes


Date on this admission: 06/03/18





- Critical Care


Critical Care patient: No

## 2018-06-03 NOTE — PN
Progress Note, Physician


History of Present Illness: 





C/O R LE pain, esprcially when weight bearing


Temps down    Afebrile


WBC  improved WNL


BC (-)


Tolerating antibiotics





- Current Medication List


Current Medications: 


Active Medications





Acetaminophen (Tylenol -)  650 mg PO Q6H PRN


   PRN Reason: FEVER


   Last Admin: 06/03/18 05:37 Dose:  650 mg


Heparin Sodium (Porcine) (Heparin -)  5,000 unit SQ TID MELA


   Last Admin: 06/03/18 05:37 Dose:  5,000 unit


Ampicillin Sodium/Sulbactam (Sodium 3 gm/ Sodium Chloride)  100 mls @ 200 mls/

hr IVPB Q6H-IV MELA


   Last Admin: 06/03/18 09:51 Dose:  200 mls/hr


Vancomycin HCl 1,500 mg/ (Sodium Chloride)  500 mls @ 250 mls/hr IVPB Q12H MELA; 

Protocol


   Last Admin: 06/03/18 11:52 Dose:  250 mls/hr


Oxycodone HCl (Roxicodone -)  5 mg PO Q6H PRN


   PRN Reason: PAIN LEVEL 1-5


   Last Admin: 06/03/18 11:55 Dose:  5 mg


Triamcinolone Acetonide (Aristocort 0.1% Cream -)  1 applic TP DAILY MELA


   Last Admin: 06/03/18 11:54 Dose:  1 applic











- Objective


Vital Signs: 


 Vital Signs











Temperature  98.1 F   06/03/18 06:00


 


Pulse Rate  85   06/03/18 06:00


 


Respiratory Rate  20   06/03/18 06:00


 


Blood Pressure  126/75   06/03/18 06:00


 


O2 Sat by Pulse Oximetry (%)  99   06/03/18 06:28











Constitutional: Yes: No Distress, Obese


Eyes: Yes: Conjunctiva Clear


Cardiovascular: Yes: Regular Rate and Rhythm, S1, S2


Respiratory: Yes: CTA Bilaterally


Gastrointestinal: Yes: Normal Bowel Sounds, Soft.  No: Tenderness


Extremities: Yes: Other (decreased erythema/ warmth R LE)


Labs: 


 CBC, BMP





 06/03/18 06:45 





 06/03/18 07:00 





 INR, PTT











INR  1.32  (0.82-1.09)  H  05/31/18  17:35    














Assessment/Plan





Recurrent R LE cellulitis


R/O sepsis secondary to cellulitis


Morbid obesity


Continue empiric  vancomycin/ unasyn


Elevation

## 2018-06-04 NOTE — PN
Physical Exam: 


SUBJECTIVE: Patient seen and examined, the patient reports pain to the right 

lower extremity upon ambulating reports headache, describes the pain as a 

pressure sensation to the top of head, patient denies any blurred vision. 

Patient reports pain is similar to headaches that he has experienced in the past





OBJECTIVE:patient is a 37-year-old morbidly obese male,with a past medical 

history of cellulitis. Patient was admitted from the emergency department for 

sepsis secondary to right lower extremity cellulitis





 Vital Signs











 Period  Temp  Pulse  Resp  BP Sys/Shahid  Pulse Ox


 


 Last 24 Hr  98.1 F-98.4 F  82-91  16-19  112-118/55-71  95-97











GENERAL: The patient is awake, alert, and fully oriented, in no acute distress.


HEAD: Normal with no signs of trauma.


EYES: PERRL, extraocular movements intact, sclera anicteric, conjunctiva clear. 

No ptosis. 


ENT: Ears normal, nares patent, oropharynx clear without exudates, moist mucous 


membranes.


NECK: Trachea midline, full range of motion, supple. 


LUNGS: Breath sounds equal, clear to auscultation bilaterally, no wheezes, no 

crackles, no 


accessory muscle use. 


HEART: Regular rate and rhythm, S1, S2 without murmur, rub or gallop.


ABDOMEN: Soft, nontender, nondistended, normoactive bowel sounds, no guarding, 

no 


rebound, no hepatosplenomegaly, no masses.


EXTREMITIES: 2+ pulses, warm, well-perfused, no edema. 


RIGHT LOWER EXTREMITY: erythema not extending past markings, trace pretibial 

edema noted, less than 3 second capillary refill, + 3 pedal pulse 


NEUROLOGICAL: Cranial nerves II through XII grossly intact. Normal speech, gait 

not 


observed.


PSYCH: Normal mood, normal affect.


SKIN: Warm, dry, normal turgor, no rashes or lesions noted














 Laboratory Results - last 24 hr











  06/03/18





  21:00


 


Vancomycin Pre-Dose  8.136  D








Active Medications











Generic Name Dose Route Start Last Admin





  Trade Name Freq  PRN Reason Stop Dose Admin


 


Acetaminophen  650 mg  06/01/18 01:40  06/04/18 05:41





  Tylenol -  PO   650 mg





  Q6H PRN   Administration





  FEVER   





     





     





     


 


Heparin Sodium (Porcine)  5,000 unit  05/31/18 22:00  06/04/18 05:42





  Heparin -  SQ   5,000 unit





  TID MELA   Administration





     





     





     





     


 


Ampicillin Sodium/Sulbactam  100 mls @ 200 mls/hr  06/01/18 15:00  06/04/18 09:

38





  Sodium 3 gm/ Sodium Chloride  IVPB   200 mls/hr





  Q6H-IV MELA   Administration





     





     





     





     


 


Vancomycin HCl 1,500 mg/  500 mls @ 250 mls/hr  06/02/18 10:11  06/04/18 09:38





  Sodium Chloride  IVPB   250 mls/hr





  Q12H MELA   Administration





     





     





  Protocol   





     


 


Oxycodone HCl  5 mg  06/01/18 10:29  06/04/18 05:41





  Roxicodone -  PO   5 mg





  Q6H PRN   Administration





  PAIN LEVEL 1-5   





     





     





     


 


Triamcinolone Acetonide  1 applic  06/03/18 10:45  06/04/18 09:38





  Aristocort 0.1% Cream -  TP   1 applic





  DAILY MELA   Administration





     





     





     





     





 Microbiology





05/31/18 17:35   Blood - Peripheral Venous   Blood Culture - Preliminary


                            NO GROWTH OBTAINED AFTER 72 HOURS, INCUBATION TO 

CONTINUE


                            FOR 2 DAYS.


05/31/18 17:35   Blood - Peripheral Venous   Blood Culture - Preliminary


                            NO GROWTH OBTAINED AFTER 72 HOURS, INCUBATION TO 

CONTINUE


                            FOR 2 DAYS.


05/31/18 17:35   Urine - Urine Clean Catch   Urine Culture - Final











ASSESSMENT/PLAN:





1) ID:


Severe sepsis s


right lower extremity  cellulitis


- improving,  cont on Vanco (6/2 -)  and Unasyn (6/1 -) continue as per ID 

recommendation 


- no leukocytosis patient afebrile blood cultures negative to date


- Infectious disease Dr. Vides consulted and following





2) Card


Hypotension


- resolves, likely secondary to sepsis





3) neurology


headache


- patient reports a history of headaches in the past, pain is similar to 

headaches from the past, Toradol and Reglan and magnesium ordered, close 

monitoring





F/E/N


hyponatremia resolved


DASH diet 











DVT: Heparin





Visit type





- Emergency Visit


Emergency Visit: Yes


ED Registration Date: 05/31/18


Care time: The patient presented to the Emergency Department on the above date 

and was hospitalized for further evaluation of their emergent condition.





- New Patient


This patient is new to me today: No





- Critical Care


Critical Care patient: No





- Discharge Referral


Referred to [facility] No

## 2018-06-05 NOTE — PN
Progress Note, Physician


History of Present Illness: 


Reports less leg pain. Able to bear weight


Temps down    Afebrile


WBC  improved WNL


BC (-)


Tolerating antibiotics





- Current Medication List


Current Medications: 


Active Medications





Acetaminophen (Tylenol -)  650 mg PO Q6H PRN


   PRN Reason: FEVER


   Last Admin: 06/04/18 05:41 Dose:  650 mg


Heparin Sodium (Porcine) (Heparin -)  5,000 unit SQ TID MELA


   Last Admin: 06/05/18 07:10 Dose:  5,000 unit


Ampicillin Sodium/Sulbactam (Sodium 3 gm/ Sodium Chloride)  100 mls @ 200 mls/

hr IVPB Q6H-IV MELA


   Last Admin: 06/05/18 03:00 Dose:  200 mls/hr


Vancomycin HCl 1,500 mg/ (Sodium Chloride)  500 mls @ 250 mls/hr IVPB Q12H MELA; 

Protocol


   Last Admin: 06/04/18 21:30 Dose:  250 mls/hr


Triamcinolone Acetonide (Aristocort 0.1% Cream -)  1 applic TP DAILY MELA


   Last Admin: 06/04/18 09:38 Dose:  1 applic











- Objective


Vital Signs: 


 Vital Signs











Temperature  98.0 F   06/05/18 06:39


 


Pulse Rate  84   06/05/18 06:39


 


Respiratory Rate  20   06/05/18 06:39


 


Blood Pressure  104/55   06/05/18 06:39


 


O2 Sat by Pulse Oximetry (%)  98   06/04/18 16:00











Constitutional: Yes: No Distress, Obese


Cardiovascular: Yes: Regular Rate and Rhythm, S1, S2


Respiratory: Yes: CTA Bilaterally


Gastrointestinal: Yes: Normal Bowel Sounds, Soft, Abdomen, Obese.  No: 

Tenderness


Edema: Yes


Integumentary: Yes: Other (mild residual erythema/ warmth distal R LE. No 

tenderness.)


Labs: 


 CBC, BMP





 06/05/18 08:16 





 06/05/18 08:16 





 INR, PTT











INR  1.32  (0.82-1.09)  H  05/31/18  17:35    














Assessment/Plan





Recurrent R LE cellulitis  improved


Morbid obesity


 


Substitute Keflex 1gm po tid x 10d


Elevation

## 2018-06-05 NOTE — PN
Physical Exam: 


SUBJECTIVE: Patient seen and examined








OBJECTIVE:





 Vital Signs











 Period  Temp  Pulse  Resp  BP Sys/Shahid  Pulse Ox


 


 Last 24 Hr  98.0 F-98.8 F  84-93  16-20  104-154/55-92  97-99











GENERAL: The patient is awake, alert, and fully oriented, in no acute distress.


HEAD: Normal with no signs of trauma.


EYES: PERRL, extraocular movements intact, sclera anicteric, conjunctiva clear. 

No ptosis. 


ENT: Ears normal, nares patent, oropharynx clear without exudates, moist mucous 


membranes.


NECK: Trachea midline, full range of motion, supple. 


LUNGS: Breath sounds equal, clear to auscultation bilaterally, no wheezes, no 

crackles, no 


accessory muscle use. 


HEART: Regular rate and rhythm, S1, S2 without murmur, rub or gallop.


ABDOMEN: Soft, nontender, nondistended, normoactive bowel sounds, no guarding, 

no 


rebound, no hepatosplenomegaly, no masses.


EXTREMITIES: 2+ pulses, warm, well-perfused, no edema. 


NEUROLOGICAL: Cranial nerves II through XII grossly intact. Normal speech, gait 

not 


observed.


PSYCH: Normal mood, normal affect.


SKIN: Warm, dry, normal turgor, no rashes or lesions noted














Active Medications











Generic Name Dose Route Start Last Admin





  Trade Name Russellq  PRN Reason Stop Dose Admin


 


Acetaminophen  650 mg  06/01/18 01:40  06/04/18 05:41





  Tylenol -  PO   650 mg





  Q6H PRN   Administration





  FEVER   





     





     





     


 


Heparin Sodium (Porcine)  5,000 unit  05/31/18 22:00  06/05/18 07:10





  Heparin -  SQ   5,000 unit





  TID MELA   Administration





     





     





     





     


 


Ampicillin Sodium/Sulbactam  100 mls @ 200 mls/hr  06/01/18 15:00  06/05/18 03:

00





  Sodium 3 gm/ Sodium Chloride  IVPB   200 mls/hr





  Q6H-IV MELA   Administration





     





     





     





     


 


Vancomycin HCl 1,500 mg/  500 mls @ 250 mls/hr  06/02/18 10:11  06/04/18 21:30





  Sodium Chloride  IVPB   250 mls/hr





  Q12H MELA   Administration





     





     





  Protocol   





     


 


Triamcinolone Acetonide  1 applic  06/03/18 10:45  06/04/18 09:38





  Aristocort 0.1% Cream -  TP   1 applic





  DAILY MELA   Administration





     





     





     





     











ASSESSMENT/PLAN:

## 2018-06-05 NOTE — DS
Physical Exam: 


SUBJECTIVE: Patient seen and examined,








OBJECTIVE:This is a 38 y/o man with a PMHx of RLE Cellulitis, Severe Obesity. 

Who presents to the ED with pain, swelling, redness to his RLE x1 day. Patient 

reports warmth and streaking up the leg. Patient endorses fever, chills and 

shaking, lightheadedness, dyspnea. Patient denies recent outdoor exposure, 

animal or bug bites. Patient reports difficulty ambulating subsequently due to 

the pain. 





ER course was notable for:


(1) Sepsis Criteria Met: T Max 103, P 122, WBC 16.8 with L shift (neutrophils 92

)


(2) Vancomycin and Ceftriaxone given


(3) 





 Vital Signs











 Period  Temp  Pulse  Resp  BP Sys/Shahid  Pulse Ox


 


 Last 24 Hr  98.0 F-98.8 F  80-93  16-20  104-154/55-92  98-99








PHYSICAL EXAM





GENERAL: The patient is awake, alert, and fully oriented, in no acute distress.


HEAD: Normal with no signs of trauma.


EYES: PERRL, extraocular movements intact, sclera anicteric, conjunctiva clear. 


ENT: Ears normal, nares patent, oropharynx clear without exudates, moist mucous 

membranes.


NECK: Trachea midline, full range of motion, supple. 


LUNGS: Breath sounds equal, clear to auscultation bilaterally, no wheezes, no 

crackles, no accessory muscle use. 


HEART: Regular rate and rhythm, S1, S2 without murmur, rub or gallop.


ABDOMEN: Soft, nontender, nondistended, normoactive bowel sounds, no guarding, 

no rebound, no hepatosplenomegaly, no masses.


EXTREMITIES: 2+ pulses, warm, well-perfused, no edema. 


NEUROLOGICAL: Cranial nerves II through XII grossly intact. Normal speech, gait 

not observed.


PSYCH: Normal mood, normal affect.


SKIN: Warm, dry, normal turgor, no rashes or lesions noted.





LABS


 Laboratory Results - last 24 hr











  06/05/18 06/05/18





  08:16 08:16


 


WBC  7.7 


 


RBC  4.39 


 


Hgb  12.4 


 


Hct  36.8 


 


MCV  83.9 


 


MCH  28.3 


 


MCHC  33.8 


 


RDW  14.4 


 


Plt Count  447 H D 


 


MPV  7.3 L 


 


Absolute Neuts (auto)  4.2 


 


Neutrophils %  54.2 


 


Lymphocytes %  31.9 


 


Monocytes %  9.8 


 


Eosinophils %  3.7 


 


Basophils %  0.4 


 


Sodium   134 L


 


Potassium   4.1


 


Chloride   105


 


Carbon Dioxide   23


 


Anion Gap   6 L


 


BUN   9  D


 


Creatinine   0.9


 


Random Glucose   83


 


Calcium   8.3 L


 


Phosphorus   3.9


 


Magnesium   2.0








 Microbiology





05/31/18 17:35   Blood - Peripheral Venous   Blood Culture - Preliminary


                            NO GROWTH OBTAINED AFTER 96 HOURS, INCUBATION TO 

CONTINUE


                            FOR 1 DAYS.


05/31/18 17:35   Blood - Peripheral Venous   Blood Culture - Preliminary


                            NO GROWTH OBTAINED AFTER 96 HOURS, INCUBATION TO 

CONTINUE


                            FOR 1 DAYS.


05/31/18 17:35   Urine - Urine Clean Catch   Urine Culture - Final








HOSPITAL COURSE:


evere sepsis secondary to right lower extremity  cellulitis,  improved, марина  

cont on Vanco (6/2 -)  and Unasyn (6/1 -) continue as per ID recommendation 


- no leukocytosis patient afebrile blood cultures negative to date


- Infectious disease Dr. Vides consulted and following





2) Card


Hypotension


- resolves, likely secondary to sepsis





3) neurology


headache


- patient reports a history of headaches in the past, pain is similar to 

headaches from the past, Toradol and Reglan and magnesium ordered, close 

monitoring





Date of Admission:05/31/18





Date of Discharge: 06/05/18











Minutes to complete discharge: 45





Discharge Summary


Reason For Visit: CELLULITIS OF R LOWER EXTREMITY


Current Active Problems





Cellulitis of right lower extremity (Acute)


Hyponatremia (Acute)








Condition: Improved





- Instructions


Diet, Activity, Other Instructions: 


continue keflex (antibiotic) as prescribed


continue to elevate the leg as much as possible  throughout today


Please follow up here primary care physician within 2 weeks


If any new or persistent symptoms develop please return to the emergency 

department


Referrals: 


Onel Kohler MD [Staff Physician] - 3 Weeks


Disposition: HOME





- Home Medications


Comprehensive Discharge Medication List: 


Ambulatory Orders





NK [No Known Home Medication]  05/31/18 











- Discharge Referral


Referred to [facility] No

## 2018-12-20 ENCOUNTER — HOSPITAL ENCOUNTER (EMERGENCY)
Dept: HOSPITAL 74 - FER | Age: 38
Discharge: HOME | End: 2018-12-20
Payer: COMMERCIAL

## 2018-12-20 VITALS — SYSTOLIC BLOOD PRESSURE: 135 MMHG | DIASTOLIC BLOOD PRESSURE: 86 MMHG | HEART RATE: 90 BPM

## 2018-12-20 VITALS — BODY MASS INDEX: 52 KG/M2

## 2018-12-20 VITALS — TEMPERATURE: 98.1 F

## 2018-12-20 DIAGNOSIS — E66.01: ICD-10-CM
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
## 2018-12-20 DIAGNOSIS — S83.412A: Primary | ICD-10-CM
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
## 2018-12-20 DIAGNOSIS — M77.9: ICD-10-CM

## 2018-12-20 NOTE — PDOC
History of Present Illness





- General


Chief Complaint: Pain


Stated Complaint: PAIN BOTH WRISTS & R KNEE


Time Seen by Provider: 12/20/18 00:34





- History of Present Illness


Initial Comments: 





This 38-year-old man works as a  at [employer] as well as 

[employer] presents with bilateral wrist pain.  Patient states that he 

has been performing activities at home during which he needs to push himself 

off of the floor with his wrists over the last week.  He has had persistent 

pain on extension of both wrists.  He denies direct trauma/fall with impact to 

his upper extremities.  No history of edema/erythema of wrist joints or fever.  

Also, unrelated to the wrist complaints, he impacted his right knee several 

days ago.  Since then, he has noticed buckling of the knee joint 

intermittently.  In the past few years, he had noted crepitus and mild pain in 

the right knee while climbing stairs.








Past History





- Past Medical History


Allergies/Adverse Reactions: 


 Allergies











Allergy/AdvReac Type Severity Reaction Status Date / Time


 


No Known Allergies Allergy   Verified 12/20/18 00:30











Home Medications: 


Ambulatory Orders





Naproxen Sodium [Anaprox Ds] 550 mg PO BID PRN #20 tablet 12/20/18 








Anemia: No


Asthma: No


Cancer: No


Cardiac Disorders: No


CVA: No


COPD: No


CHF: No


Dementia: No


Diabetes: No


GI Disorders: No


 Disorders: No


HTN: No


Hypercholesterolemia: No


Liver Disease: No


Seizures: No


Thyroid Disease: No





- Suicide/Smoking/Psychosocial Hx


Smoking Status: No


Smoking History: Never smoked


Years of Tobacco Use: 0


Number of Cigarettes Smoked Daily: 0


Hx Alcohol Use: No


Drug/Substance Use Hx: No


Substance Use Type: None


Hx Substance Use Treatment: No





**Review of Systems





- Review of Systems


Able to Perform ROS?: Yes


Comments:: 





12 point review of systems is negative except for what is noted in the history 

of present illness








*Physical Exam





- Physical Exam


Comments: 


GENERAL: Morbidly obese, adult male in no acute distress


HEAD: Normal with no signs of trauma.


EYES: PERRLA, EOMI, sclera anicteric, conjunctiva clear.


EXTREMITIES: Bilateral wrists-hyperextension reproduces pain bilaterally; no 

tenderness/edema/erythema/deformity of either wrist


                           Remainder of the upper extremity exam is normal.


                           Mild tenderness/crepitus on flexion of right knee; 

no deformity/ecchymosis/ligamentous instability


                            Remainder of the extremity exam is normal.


NEUROLOGICAL: Cranial nerves II through XII grossly intact.  Normal speech.  No 

focal 


neurological deficits. 


MUSCULOSKELETAL:  Back non-tender to palpation, no CVA tenderness


SKIN: Warm, Dry, normal turgor, no rashes or lesions noted.








Progress Note





- Progress Note


Progress Note: 





X-ray of right knee performed.  Preliminary result by Imaging on Call: No 

fracture or dislocation; patellar tendon calcification present.





Results discussed with the patient.  Clinical presentation most consistent with 

bilateral wrist tendinitis and right knee contusion.  Patient will be given Ace 

wrap to the right knee as needed to be used during the day; wrap should be 

taken off at night.  Prescription for Anaprox DS to be used up to twice a day  (

with food).  The patient will be given referral information for the [facility].  Meanwhile, patient should not work the next 2 days and 

should make arrangements to have follow-up in orthopedic office.





*DC/Admit/Observation/Transfer


Diagnosis at time of Disposition: 


 Wrist tendonitis





Left knee sprain


Qualifiers:


 Encounter type: initial encounter Involved ligament of knee: medial collateral 

ligament Qualified Code(s): S83.412A - Sprain of medial collateral ligament of 

left knee, initial encounter








- Discharge Dispostion


Disposition: HOME


Condition at time of disposition: Stable





- Prescriptions


Prescriptions: 


Naproxen Sodium [Anaprox Ds] 550 mg PO BID PRN #20 tablet


 PRN Reason: Pain





- Referrals





- Patient Instructions


Printed Discharge Instructions:  DI for Knee Sprain, DI for Tendinitis


Additional Instructions: 


Avoid strenuous activity involving the wrists/knees for the next several days


Ace wrap to right knee during the day, remove at night


Anaprox DS twice a day as needed; take with food


Follow-up with Dr.Ilan devi (call office tomorrow)


No work until Saturday, December 22





- Post Discharge Activity


Forms/Work/School Notes:  Back to Work